# Patient Record
Sex: MALE | Race: WHITE | Employment: OTHER | URBAN - METROPOLITAN AREA
[De-identification: names, ages, dates, MRNs, and addresses within clinical notes are randomized per-mention and may not be internally consistent; named-entity substitution may affect disease eponyms.]

---

## 2017-01-19 ENCOUNTER — OFFICE VISIT (OUTPATIENT)
Dept: PAIN CLINIC | Facility: HOSPITAL | Age: 77
End: 2017-01-19
Attending: ANESTHESIOLOGY
Payer: MEDICARE

## 2017-01-19 DIAGNOSIS — M51.16 LUMBAR DISC HERNIATION WITH RADICULOPATHY: ICD-10-CM

## 2017-01-19 DIAGNOSIS — M54.2 CERVICALGIA: Primary | ICD-10-CM

## 2017-01-19 PROCEDURE — 99211 OFF/OP EST MAY X REQ PHY/QHP: CPT

## 2017-01-19 NOTE — CHRONIC PAIN
Initial Consultation Note      HISTORY OF PRESENT ILLNESS:  Kristan Miller is a 78year old old male referred to the pain clinic by Dr. Martha harrison.  provider found for low back pain and bilateral lateral leg pain which the patient states that originally began many Incontinence: as above  Coughing/sneezing/straining does  exacerbate the pain.   Numbness/tingling: as above  Weakness: as above  Weight Loss: Negative   Fever: Negative   Cardiovascular:  No current chest pain or palpitations   Respiratory:  No current yordy on file    Alcohol Use: Yes    Comment: occasionally    Drug Use: No    Sexual Activity: Not on file   Not on file  Other Topics Concern    Caffeine Concern No    Pt has a pacemaker No    Pt has a defibrillator No    Reaction to local anesthetic No     Soc worse on the right than the left causing severe subarticular zone narrowing L2-3 disc osteophyte complex bilateral facet arthrosis spinal canal stenosis moderate to severe L3-4 L4-5 also degenerative disc disease with moderate stenosis.     LABS:    Lab Res

## 2017-01-19 NOTE — PROGRESS NOTES
NEW TO [facility] 12-22-16. WAS A . FELL OFF A LADDER ONTO FLOOR ON TAILBONE 1999. XRAYS DONE. MRI Hiram Lau. TOLD SCIATIC NERVE IRRITATED. PAIN DOWN LEFT LEG, HAD PHYSICAL THERAPY. PAIN LASTED A COUPLE OF WEEKS. TRACTION X 2 RESOLVED PAIN.

## 2017-01-20 ENCOUNTER — OFFICE VISIT (OUTPATIENT)
Dept: INTEGRATIVE MEDICINE | Facility: HOSPITAL | Age: 77
End: 2017-01-20
Attending: ANESTHESIOLOGY

## 2017-01-20 NOTE — PROGRESS NOTES
HPI:    Patient ID: Kristan Miller is a 78year old male.     HPI    Review of Systems         Current Outpatient Prescriptions:  Cyclobenzaprine HCl (FLEXERIL) 10 MG Oral Tab Take 1 tablet (10 mg total) by mouth 3 (three) times daily as needed for Muscle spas shoulders and hips B/I    Goals: Client will stretch in the morning and evening doing ITB Stretch for hips with light stretching         PLAN:     Treatment Plan: Client will reschedule massage and possibly acccupunture    Self-Care Plan:Streching of hips

## 2017-01-23 ENCOUNTER — OFFICE VISIT (OUTPATIENT)
Dept: FAMILY MEDICINE CLINIC | Facility: CLINIC | Age: 77
End: 2017-01-23

## 2017-01-23 VITALS
SYSTOLIC BLOOD PRESSURE: 142 MMHG | HEART RATE: 65 BPM | TEMPERATURE: 98 F | BODY MASS INDEX: 35.83 KG/M2 | HEIGHT: 67.5 IN | WEIGHT: 231 LBS | DIASTOLIC BLOOD PRESSURE: 92 MMHG

## 2017-01-23 DIAGNOSIS — E78.00 HYPERCHOLESTEROLEMIA: ICD-10-CM

## 2017-01-23 DIAGNOSIS — Z12.5 SCREENING FOR PROSTATE CANCER: ICD-10-CM

## 2017-01-23 DIAGNOSIS — I10 ESSENTIAL HYPERTENSION: Primary | ICD-10-CM

## 2017-01-23 DIAGNOSIS — Z00.00 ENCOUNTER FOR ANNUAL HEALTH EXAMINATION: ICD-10-CM

## 2017-01-23 PROCEDURE — G0438 PPPS, INITIAL VISIT: HCPCS | Performed by: FAMILY MEDICINE

## 2017-01-23 PROCEDURE — 96160 PT-FOCUSED HLTH RISK ASSMT: CPT | Performed by: FAMILY MEDICINE

## 2017-01-23 RX ORDER — LISINOPRIL 20 MG/1
TABLET ORAL
Qty: 30 TABLET | Refills: 11 | Status: SHIPPED | OUTPATIENT
Start: 2017-01-23 | End: 2018-01-09

## 2017-01-23 RX ORDER — SIMVASTATIN 20 MG
TABLET ORAL
Qty: 30 TABLET | Refills: 11 | Status: SHIPPED | OUTPATIENT
Start: 2017-01-23 | End: 2018-01-09

## 2017-01-23 NOTE — PATIENT INSTRUCTIONS
Verónica Cruz's SCREENING SCHEDULE   Tests on this list are recommended by your physician but may not be covered, or covered at this frequency, by your insurer. Please check with your insurance carrier before scheduling to verify coverage.     PREVENTATIVE Covered every 10 years- more often if abnormal Colonoscopy,5 Years due on 10/27/2019 Update [facility] if applicable    Flex Sigmoidoscopy Screen  Covered every 5 years No results found for this or any previous visit. No flowsheet data found. Medicare does not cover unless Medically needed    Zoster (Not covered by Medicare Part B) No orders found for this or any previous visit.  This may be covered with your pharmacy  prescription benefits     Recommended Websites for Advanced Directives    htt

## 2017-01-23 NOTE — PROGRESS NOTES
HPI:   Leah Hazel is a 78year old male who presents for a Medicare Subsequent Annual Wellness visit (Pt already had Initial Annual Wellness).     Patient presents with:  Physical: Medicare Annual    His last annual assessment has been over 1 year: Coretta Mariee Back problem; Osteoarthritis; Bronchitis; and Calculus of kidney. He  has past surgical history that includes Retinal laser procedure (8/7/12); tonsillectomy; and other surgical history.     His family history includes Cancer in his father; Colon Cancer to   understand conversations:  No   I have to worry about missing the telephone ring or doorbell:  No I have   trouble hearing conversations in a noisy background such as a crowded room   or restaurant:  Sometimes   I get confused about where sounds come Nancy does not have a Power of  for [employer] on file in [place]. Discussed with patient and provided information             PLAN:  The patient indicates understanding of these issues and agrees to the plan. No Follow-up on file.      Kate Horan activities?: 0-No     Have you had any memory issues?: 0-No    Fall/Risk Scorin    Scoring Interpretation: 0 - 3 No Risk     Depression Screening (PHQ-2/PHQ-9): Over the LAST 2 WEEKS   Little interest or pleasure in doing things (over the last two week flowsheet data found. Prostate Cancer Screening      PSA  Annually PSA due on 01/28/2018  Update Health Maintenance if applicable   Immunizations      Influenza No orders found for this or any previous visit.  Update Immunization Activity if applicable Capsule Delayed Release Take  by mouth. once daily Disp:  Rfl:    Cyclobenzaprine HCl (FLEXERIL) 10 MG Oral Tab Take 1 tablet (10 mg total) by mouth 3 (three) times daily as needed for Muscle spasms.  Disp: 21 tablet Rfl: 1     Allergies:No Known Allergies

## 2017-01-25 ENCOUNTER — APPOINTMENT (OUTPATIENT)
Dept: LAB | Age: 77
End: 2017-01-25
Attending: FAMILY MEDICINE
Payer: MEDICARE

## 2017-01-25 DIAGNOSIS — Z12.5 SCREENING FOR PROSTATE CANCER: ICD-10-CM

## 2017-01-25 DIAGNOSIS — I10 ESSENTIAL HYPERTENSION: ICD-10-CM

## 2017-01-25 LAB
ALBUMIN SERPL BCP-MCNC: 4 G/DL (ref 3.5–4.8)
ALBUMIN/GLOB SERPL: 1.6 {RATIO} (ref 1–2)
ALP SERPL-CCNC: 66 U/L (ref 32–100)
ALT SERPL-CCNC: 28 U/L (ref 17–63)
ANION GAP SERPL CALC-SCNC: 8 MMOL/L (ref 0–18)
AST SERPL-CCNC: 20 U/L (ref 15–41)
BILIRUB SERPL-MCNC: 0.9 MG/DL (ref 0.3–1.2)
BILIRUB UR QL: NEGATIVE
BUN SERPL-MCNC: 15 MG/DL (ref 8–20)
BUN/CREAT SERPL: 12.1 (ref 10–20)
CALCIUM SERPL-MCNC: 9.2 MG/DL (ref 8.5–10.5)
CHLORIDE SERPL-SCNC: 105 MMOL/L (ref 95–110)
CHOLEST SERPL-MCNC: 153 MG/DL (ref 110–200)
CLARITY UR: CLEAR
CO2 SERPL-SCNC: 31 MMOL/L (ref 22–32)
COLOR UR: YELLOW
CREAT SERPL-MCNC: 1.24 MG/DL (ref 0.5–1.5)
GLOBULIN PLAS-MCNC: 2.5 G/DL (ref 2.5–3.7)
GLUCOSE SERPL-MCNC: 126 MG/DL (ref 70–99)
GLUCOSE UR-MCNC: NEGATIVE MG/DL
HDLC SERPL-MCNC: 41 MG/DL
HGB UR QL STRIP.AUTO: NEGATIVE
KETONES UR-MCNC: NEGATIVE MG/DL
LDLC SERPL CALC-MCNC: 87 MG/DL (ref 0–99)
LEUKOCYTE ESTERASE UR QL STRIP.AUTO: NEGATIVE
NITRITE UR QL STRIP.AUTO: NEGATIVE
NONHDLC SERPL-MCNC: 112 MG/DL
OSMOLALITY UR CALC.SUM OF ELEC: 300 MOSM/KG (ref 275–295)
PH UR: 6 [PH] (ref 5–8)
POTASSIUM SERPL-SCNC: 4.9 MMOL/L (ref 3.3–5.1)
PROT SERPL-MCNC: 6.5 G/DL (ref 5.9–8.4)
PROT UR-MCNC: NEGATIVE MG/DL
PSA SERPL-MCNC: 2.6 NG/ML (ref 0–4)
SODIUM SERPL-SCNC: 144 MMOL/L (ref 136–144)
SP GR UR STRIP: 1.01 (ref 1–1.03)
TRIGL SERPL-MCNC: 124 MG/DL (ref 1–149)
UROBILINOGEN UR STRIP-ACNC: <2
VIT C UR-MCNC: NEGATIVE MG/DL

## 2017-01-25 PROCEDURE — 80061 LIPID PANEL: CPT

## 2017-01-25 PROCEDURE — 81003 URINALYSIS AUTO W/O SCOPE: CPT

## 2017-01-25 PROCEDURE — 80053 COMPREHEN METABOLIC PANEL: CPT

## 2017-01-25 PROCEDURE — 36415 COLL VENOUS BLD VENIPUNCTURE: CPT

## 2017-02-10 ENCOUNTER — OFFICE VISIT (OUTPATIENT)
Dept: INTEGRATIVE MEDICINE | Facility: HOSPITAL | Age: 77
End: 2017-02-10
Attending: GENERAL ACUTE CARE HOSPITAL

## 2017-02-10 DIAGNOSIS — M54.42 CHRONIC BILATERAL LOW BACK PAIN WITH LEFT-SIDED SCIATICA: Primary | ICD-10-CM

## 2017-02-10 DIAGNOSIS — G89.29 CHRONIC BILATERAL LOW BACK PAIN WITH LEFT-SIDED SCIATICA: Primary | ICD-10-CM

## 2017-02-10 NOTE — PROGRESS NOTES
Frannie Ferrell came in to help prevent the pain in his back from coming back. He has had chronic sciatic problems on the left side of his back.   Then is the fall around October 2016, he was carrying something in from the yard and felt his back tighten and it nev biggest trigger. High BP.   Skin: Good and cuts his nails sometimes twice a week because they are growing so fast.  Emotional: He has had some major stresses that he didn't go into and it has led to his emotions being all over the place and still feels t

## 2017-02-21 ENCOUNTER — OFFICE VISIT (OUTPATIENT)
Dept: INTEGRATIVE MEDICINE | Facility: HOSPITAL | Age: 77
End: 2017-02-21
Attending: GENERAL ACUTE CARE HOSPITAL

## 2017-02-21 DIAGNOSIS — M54.42 CHRONIC BILATERAL LOW BACK PAIN WITH LEFT-SIDED SCIATICA: Primary | ICD-10-CM

## 2017-02-21 DIAGNOSIS — G89.29 CHRONIC BILATERAL LOW BACK PAIN WITH LEFT-SIDED SCIATICA: Primary | ICD-10-CM

## 2017-02-21 NOTE — PROGRESS NOTES
Harjinder Paige was feeling so well from the previous treatment that he did not have any pain in his low back. He was even able to walk around the auto show for four hours and only felt a little tight at the end.   He is not getting any of the radiating pain anymor

## 2017-03-10 ENCOUNTER — OFFICE VISIT (OUTPATIENT)
Dept: INTEGRATIVE MEDICINE | Facility: HOSPITAL | Age: 77
End: 2017-03-10
Attending: GENERAL ACUTE CARE HOSPITAL

## 2017-03-10 DIAGNOSIS — M54.42 CHRONIC BILATERAL LOW BACK PAIN WITH LEFT-SIDED SCIATICA: Primary | ICD-10-CM

## 2017-03-10 DIAGNOSIS — G89.29 CHRONIC BILATERAL LOW BACK PAIN WITH LEFT-SIDED SCIATICA: Primary | ICD-10-CM

## 2017-03-10 NOTE — PROGRESS NOTES
Verónica Kent has seen such an improvement and it has continued to hold. He only feels a slight bit of stiffness in the morning and if he has been sitting for a long time on a hard chair.  Then he will move to a softer, more comfortable chair and recline to help

## 2017-04-25 ENCOUNTER — OFFICE VISIT (OUTPATIENT)
Dept: FAMILY MEDICINE CLINIC | Facility: CLINIC | Age: 77
End: 2017-04-25

## 2017-04-25 VITALS
BODY MASS INDEX: 36 KG/M2 | TEMPERATURE: 99 F | WEIGHT: 231 LBS | HEART RATE: 76 BPM | SYSTOLIC BLOOD PRESSURE: 143 MMHG | DIASTOLIC BLOOD PRESSURE: 87 MMHG

## 2017-04-25 DIAGNOSIS — J01.00 ACUTE MAXILLARY SINUSITIS, RECURRENCE NOT SPECIFIED: Primary | ICD-10-CM

## 2017-04-25 PROBLEM — D69.6 THROMBOCYTOPENIA (HCC): Chronic | Status: ACTIVE | Noted: 2017-04-25

## 2017-04-25 PROBLEM — E66.01 SEVERE OBESITY (BMI 35.0-39.9) WITH COMORBIDITY (HCC): Chronic | Status: ACTIVE | Noted: 2017-04-25

## 2017-04-25 PROCEDURE — G0463 HOSPITAL OUTPT CLINIC VISIT: HCPCS | Performed by: FAMILY MEDICINE

## 2017-04-25 PROCEDURE — 99213 OFFICE O/P EST LOW 20 MIN: CPT | Performed by: FAMILY MEDICINE

## 2017-04-25 RX ORDER — AMOXICILLIN AND CLAVULANATE POTASSIUM 875; 125 MG/1; MG/1
1 TABLET, FILM COATED ORAL 2 TIMES DAILY
Qty: 14 TABLET | Refills: 0 | Status: SHIPPED | OUTPATIENT
Start: 2017-04-25 | End: 2017-05-05

## 2017-04-25 NOTE — PROGRESS NOTES
HPI:    Patient ID: Matty Xiao is a 78year old male. HPI  Patient presents with:  Ear Pain: c/o right ear pain  Sore Throat    Review of Systems   Constitutional: Negative. HENT: Positive for congestion, rhinorrhea and sinus pressure.     Respirator

## 2017-04-27 ENCOUNTER — TELEPHONE (OUTPATIENT)
Dept: INTEGRATIVE MEDICINE | Facility: HOSPITAL | Age: 77
End: 2017-04-27

## 2017-05-01 ENCOUNTER — OFFICE VISIT (OUTPATIENT)
Dept: FAMILY MEDICINE CLINIC | Facility: CLINIC | Age: 77
End: 2017-05-01

## 2017-05-01 VITALS
WEIGHT: 231 LBS | DIASTOLIC BLOOD PRESSURE: 87 MMHG | TEMPERATURE: 98 F | SYSTOLIC BLOOD PRESSURE: 143 MMHG | HEART RATE: 69 BPM | BODY MASS INDEX: 36 KG/M2

## 2017-05-01 DIAGNOSIS — H61.21 IMPACTED CERUMEN OF RIGHT EAR: ICD-10-CM

## 2017-05-01 DIAGNOSIS — H92.01 EAR PAIN, RIGHT: Primary | ICD-10-CM

## 2017-05-01 PROCEDURE — 99212 OFFICE O/P EST SF 10 MIN: CPT | Performed by: FAMILY MEDICINE

## 2017-05-01 PROCEDURE — G0463 HOSPITAL OUTPT CLINIC VISIT: HCPCS | Performed by: FAMILY MEDICINE

## 2017-05-01 PROCEDURE — 69209 REMOVE IMPACTED EAR WAX UNI: CPT | Performed by: FAMILY MEDICINE

## 2017-05-01 NOTE — PROGRESS NOTES
HPI:    Patient ID: Jewell Martinez is a 78year old male. HPI  Patient presents with:  Ear Pain: f/u from 4/25/17, right ear pain     Review of Systems   Constitutional: Negative. HENT: Positive for ear pain and hearing loss.              Current Outpati

## 2017-05-15 ENCOUNTER — TELEPHONE (OUTPATIENT)
Dept: FAMILY MEDICINE CLINIC | Facility: CLINIC | Age: 77
End: 2017-05-15

## 2017-05-15 NOTE — TELEPHONE ENCOUNTER
Pt's wife called in wanting to let Dr. Connor Hearn know that pt has been admitted to [facility] yesterday evening.   Pt's wife states pt has TIA and is going to need a heart monitor/may need a referral to see a cardiologist.  Pt's wife did book a hospital f

## 2017-05-16 NOTE — TELEPHONE ENCOUNTER
Left message on wifes VM stating we have been getting faxes from [facility] regarding pt

## 2017-05-16 NOTE — TELEPHONE ENCOUNTER
Dr. Ely Sanabria,   see below, all information is in your blue folder from [facility], pt has appt. Thursday with you.

## 2017-05-18 ENCOUNTER — OFFICE VISIT (OUTPATIENT)
Dept: FAMILY MEDICINE CLINIC | Facility: CLINIC | Age: 77
End: 2017-05-18

## 2017-05-18 VITALS
WEIGHT: 225 LBS | HEIGHT: 69 IN | RESPIRATION RATE: 16 BRPM | DIASTOLIC BLOOD PRESSURE: 84 MMHG | SYSTOLIC BLOOD PRESSURE: 124 MMHG | HEART RATE: 64 BPM | TEMPERATURE: 99 F | BODY MASS INDEX: 33.33 KG/M2

## 2017-05-18 DIAGNOSIS — I72.9 ANEURYSM (HCC): Primary | ICD-10-CM

## 2017-05-18 DIAGNOSIS — G45.9 TRANSIENT CEREBRAL ISCHEMIA, UNSPECIFIED TYPE: ICD-10-CM

## 2017-05-18 DIAGNOSIS — I10 ESSENTIAL HYPERTENSION: ICD-10-CM

## 2017-05-18 PROCEDURE — 99214 OFFICE O/P EST MOD 30 MIN: CPT | Performed by: FAMILY MEDICINE

## 2017-05-18 PROCEDURE — G0463 HOSPITAL OUTPT CLINIC VISIT: HCPCS | Performed by: FAMILY MEDICINE

## 2017-05-18 RX ORDER — ASPIRIN 325 MG
325 TABLET ORAL DAILY
Status: ON HOLD | COMMUNITY
End: 2020-07-26

## 2017-05-19 NOTE — PROGRESS NOTES
HPI:    Patient ID: Arvind Paulino is a 78year old male. HPI  Patient presents with:  Numbness: Pt is f/u from a visit at [facility] due to an episode of numbness. Pt states he feels better today. States the episode ended about 5 hours after it happened. Transient cerebral ischemia, unspecified type  Essential hypertension  Aneurysm (hcc)  (primary encounter diagnosis)  He is currently on a heart monitor. Recommend he follow-up cardiology with heart monitor is completed. Recommend daily aspirin.   Ryan Mendez

## 2017-05-23 ENCOUNTER — OFFICE VISIT (OUTPATIENT)
Dept: INTEGRATIVE MEDICINE | Facility: HOSPITAL | Age: 77
End: 2017-05-23
Attending: GENERAL ACUTE CARE HOSPITAL

## 2017-05-23 DIAGNOSIS — N52.8 OTHER MALE ERECTILE DYSFUNCTION: Primary | ICD-10-CM

## 2017-05-23 NOTE — PROGRESS NOTES
Carlos Bates came in today wanting to work on erectile dysfunction. He has found that he is not able to get full erect for the past two years. He has a hard time as well sustaining an erection as well.   He was in the hospital today with his wife fore some thin

## 2017-05-30 ENCOUNTER — OFFICE VISIT (OUTPATIENT)
Dept: INTEGRATIVE MEDICINE | Facility: HOSPITAL | Age: 77
End: 2017-05-30
Attending: GENERAL ACUTE CARE HOSPITAL

## 2017-05-30 DIAGNOSIS — N52.8 OTHER MALE ERECTILE DYSFUNCTION: Primary | ICD-10-CM

## 2017-05-30 NOTE — PROGRESS NOTES
Shylamookie Sneha said that while he still has his heart monitor on to detect if he has atrial fibrillation, the has not been in the mood to have intercourse.   We will continue with treatments and see how he is doing and then after next treatment he goes to have the

## 2017-06-15 ENCOUNTER — OFFICE VISIT (OUTPATIENT)
Dept: INTEGRATIVE MEDICINE | Facility: HOSPITAL | Age: 77
End: 2017-06-15
Attending: GENERAL ACUTE CARE HOSPITAL

## 2017-06-15 DIAGNOSIS — N52.8 OTHER MALE ERECTILE DYSFUNCTION: Primary | ICD-10-CM

## 2017-06-15 NOTE — PROGRESS NOTES
Chas Kenyon just had his heart monitor removed and his primary reason for coming in right now is erectile dysfunction. He commented last treatment and again today that he and his wife were trying to have intercourse with the monitor on.  Now that it is removed

## 2017-06-19 ENCOUNTER — OFFICE VISIT (OUTPATIENT)
Dept: DERMATOLOGY CLINIC | Facility: CLINIC | Age: 77
End: 2017-06-19

## 2017-06-19 DIAGNOSIS — D23.60 BENIGN NEOPLASM OF SKIN OF UPPER LIMB, INCLUDING SHOULDER, UNSPECIFIED LATERALITY: ICD-10-CM

## 2017-06-19 DIAGNOSIS — Z86.018 HISTORY OF DYSPLASTIC NEVUS: Primary | ICD-10-CM

## 2017-06-19 DIAGNOSIS — D23.30 BENIGN NEOPLASM OF SKIN OF FACE: ICD-10-CM

## 2017-06-19 DIAGNOSIS — D23.70 BENIGN NEOPLASM OF SKIN OF LOWER LIMB, INCLUDING HIP, UNSPECIFIED LATERALITY: ICD-10-CM

## 2017-06-19 DIAGNOSIS — D23.4 BENIGN NEOPLASM OF SCALP AND SKIN OF NECK: ICD-10-CM

## 2017-06-19 DIAGNOSIS — L82.1 SEBORRHEIC KERATOSES: ICD-10-CM

## 2017-06-19 DIAGNOSIS — D22.9 MULTIPLE NEVI: ICD-10-CM

## 2017-06-19 DIAGNOSIS — D23.5 BENIGN NEOPLASM OF SKIN OF TRUNK, EXCEPT SCROTUM: ICD-10-CM

## 2017-06-19 PROCEDURE — 99213 OFFICE O/P EST LOW 20 MIN: CPT | Performed by: DERMATOLOGY

## 2017-06-19 PROCEDURE — G0463 HOSPITAL OUTPT CLINIC VISIT: HCPCS | Performed by: DERMATOLOGY

## 2017-06-20 ENCOUNTER — TELEPHONE (OUTPATIENT)
Dept: CARDIOLOGY CLINIC | Facility: CLINIC | Age: 77
End: 2017-06-20

## 2017-06-20 NOTE — TELEPHONE ENCOUNTER
Pts wife called to find out if monitor results were received from [facility] Andino. Pt has consult appt on Thurs 6/22/17 with Dr. Sonia Soliz and needs to make sure records have been received. Please call.

## 2017-06-22 ENCOUNTER — OFFICE VISIT (OUTPATIENT)
Dept: CARDIOLOGY CLINIC | Facility: CLINIC | Age: 77
End: 2017-06-22

## 2017-06-22 ENCOUNTER — OFFICE VISIT (OUTPATIENT)
Dept: INTEGRATIVE MEDICINE | Facility: HOSPITAL | Age: 77
End: 2017-06-22
Attending: GENERAL ACUTE CARE HOSPITAL

## 2017-06-22 VITALS
DIASTOLIC BLOOD PRESSURE: 70 MMHG | RESPIRATION RATE: 16 BRPM | BODY MASS INDEX: 34.51 KG/M2 | WEIGHT: 233 LBS | SYSTOLIC BLOOD PRESSURE: 124 MMHG | HEIGHT: 69 IN | HEART RATE: 74 BPM

## 2017-06-22 DIAGNOSIS — I10 ESSENTIAL HYPERTENSION: Primary | ICD-10-CM

## 2017-06-22 DIAGNOSIS — E78.00 HYPERCHOLESTEROLEMIA: ICD-10-CM

## 2017-06-22 DIAGNOSIS — R93.89 ABNORMAL FINDINGS ON DIAGNOSTIC IMAGING OF CARDIOVASCULAR SYSTEM: ICD-10-CM

## 2017-06-22 DIAGNOSIS — N52.8 OTHER MALE ERECTILE DYSFUNCTION: Primary | ICD-10-CM

## 2017-06-22 PROCEDURE — 99204 OFFICE O/P NEW MOD 45 MIN: CPT | Performed by: INTERNAL MEDICINE

## 2017-06-22 PROCEDURE — G0463 HOSPITAL OUTPT CLINIC VISIT: HCPCS | Performed by: INTERNAL MEDICINE

## 2017-06-22 RX ORDER — GARLIC EXTRACT 500 MG
1 CAPSULE ORAL DAILY
COMMUNITY
End: 2020-01-06

## 2017-06-22 NOTE — H&P
Aarti Mcbride is a 78year old male. HPI:   This is a pleasant 30-year-old with hypertension and elevated cholesterol who presents for cardiac assessment of abnormal Holter monitor after he had a workup for numbness of the right side in May.   Patient presen laser surgery   • Back problem    • Osteoarthritis    • Bronchitis    • Calculus of kidney       Social History:    Smoking Status: Never Smoker                      Alcohol Use: Yes                Comment: occasionally       REVIEW OF SYSTEMS:   GENERAL H assess his TIA symptoms and description of a aneurysm detected on CT of his brain. He will call if changes. He understands of the stress test is abnormal further ischemic testing may be required  - CARD NUCLEAR EXERCISE STRESS TEST ([id number]=[id number]);  Future

## 2017-06-27 ENCOUNTER — HOSPITAL ENCOUNTER (OUTPATIENT)
Dept: NUCLEAR MEDICINE | Facility: HOSPITAL | Age: 77
Discharge: HOME | End: 2017-06-27
Attending: INTERNAL MEDICINE
Payer: MEDICARE

## 2017-06-27 ENCOUNTER — APPOINTMENT (OUTPATIENT)
Dept: INTEGRATIVE MEDICINE | Facility: HOSPITAL | Age: 77
End: 2017-06-27
Attending: GENERAL ACUTE CARE HOSPITAL

## 2017-06-27 ENCOUNTER — HOSPITAL ENCOUNTER (OUTPATIENT)
Dept: CV DIAGNOSTICS | Facility: HOSPITAL | Age: 77
Discharge: HOME | End: 2017-06-27
Attending: INTERNAL MEDICINE
Payer: MEDICARE

## 2017-06-27 DIAGNOSIS — I10 ESSENTIAL HYPERTENSION: ICD-10-CM

## 2017-06-27 DIAGNOSIS — E78.00 HYPERCHOLESTEROLEMIA: ICD-10-CM

## 2017-06-27 DIAGNOSIS — R93.89 ABNORMAL FINDINGS ON DIAGNOSTIC IMAGING OF CARDIOVASCULAR SYSTEM: ICD-10-CM

## 2017-06-27 PROCEDURE — 93017 CV STRESS TEST TRACING ONLY: CPT | Performed by: INTERNAL MEDICINE

## 2017-06-27 PROCEDURE — 93018 CV STRESS TEST I&R ONLY: CPT | Performed by: INTERNAL MEDICINE

## 2017-06-27 PROCEDURE — 78452 HT MUSCLE IMAGE SPECT MULT: CPT | Performed by: INTERNAL MEDICINE

## 2017-06-27 PROCEDURE — 93016 CV STRESS TEST SUPVJ ONLY: CPT | Performed by: INTERNAL MEDICINE

## 2017-06-27 RX ORDER — SODIUM CHLORIDE 9 MG/ML
INJECTION, SOLUTION INTRAVENOUS
Status: COMPLETED
Start: 2017-06-27 | End: 2017-06-27

## 2017-06-27 RX ADMIN — SODIUM CHLORIDE 50 ML: 9 INJECTION, SOLUTION INTRAVENOUS at 13:45:00

## 2017-06-29 ENCOUNTER — TELEPHONE (OUTPATIENT)
Dept: CARDIOLOGY CLINIC | Facility: CLINIC | Age: 77
End: 2017-06-29

## 2017-06-29 RX ORDER — METOPROLOL SUCCINATE 25 MG/1
12.5 TABLET, EXTENDED RELEASE ORAL DAILY
Qty: 15 TABLET | Refills: 2 | Status: SHIPPED | OUTPATIENT
Start: 2017-06-29 | End: 2017-09-25

## 2017-06-29 NOTE — TELEPHONE ENCOUNTER
Per MDB, pt should start on toprol 12.5 mg daily and continue monitor BP/ pulse. Pt notified. Rx sent.

## 2017-07-09 NOTE — PROGRESS NOTES
Tonny Stahl is a 78year old male. HPI:     CC:  Patient presents with:  Full Skin Exam: LOV 1/8/2017. Pt presenting for full body skin exam. Pt has a personal hx of dyslastic nevi.         Allergies:  Review of patient's allergies indicates no known aller by mouth. once daily Disp:  Rfl:    Metoprolol Succinate ER 25 MG Oral Tablet 24 Hr Take 0.5 tablets (12.5 mg total) by mouth daily. Disp: 15 tablet Rfl: 2   Acidophilus/Pectin Oral Cap Take 1 capsule by mouth daily.  Disp:  Rfl:      Allergies:   No Known Pt presenting for full body skin exam. Pt has a personal hx of dyslastic nevi. Patient presents with concerns above. Patient has been in their usual state of health. History, medications, allergies reviewed as noted.       ROS:  Denies any other sy lower back excised with margins. Multiple benign dermatofibromas reassurance    See prior body maps. Irregular patches consistent with keratoses inflamed partial response to cryo previously mid back, left scapula.     Sun damage, extensive keratoses,

## 2017-07-17 ENCOUNTER — OFFICE VISIT (OUTPATIENT)
Dept: FAMILY MEDICINE CLINIC | Facility: CLINIC | Age: 77
End: 2017-07-17

## 2017-07-17 VITALS
WEIGHT: 233 LBS | SYSTOLIC BLOOD PRESSURE: 124 MMHG | BODY MASS INDEX: 34 KG/M2 | HEART RATE: 56 BPM | DIASTOLIC BLOOD PRESSURE: 84 MMHG | TEMPERATURE: 98 F

## 2017-07-17 DIAGNOSIS — I67.1 INTRACRANIAL ANEURYSM: Primary | ICD-10-CM

## 2017-07-17 PROCEDURE — G0463 HOSPITAL OUTPT CLINIC VISIT: HCPCS | Performed by: FAMILY MEDICINE

## 2017-07-17 PROCEDURE — 99214 OFFICE O/P EST MOD 30 MIN: CPT | Performed by: FAMILY MEDICINE

## 2017-09-25 ENCOUNTER — TELEPHONE (OUTPATIENT)
Dept: CARDIOLOGY CLINIC | Facility: CLINIC | Age: 77
End: 2017-09-25

## 2017-09-25 RX ORDER — METOPROLOL SUCCINATE 25 MG/1
12.5 TABLET, EXTENDED RELEASE ORAL DAILY
Qty: 15 TABLET | Refills: 3 | Status: SHIPPED | OUTPATIENT
Start: 2017-09-25 | End: 2018-01-09

## 2017-09-25 NOTE — TELEPHONE ENCOUNTER
Pharmacy calling regarding rx: Metoprolol, indicates still waiting for authorization for rx.  Pls call at:[phone],thanks    Current Outpatient Prescriptions:   •  Metoprolol Succinate ER 25 MG Oral Tablet 24 Hr, Take 0.5 tablets (12.5 mg total) by vitor

## 2017-09-27 NOTE — TELEPHONE ENCOUNTER
Wife called and states the pharmacy did not received approval for refill. Was unable to reach RN.  Please call [phone] Thank You

## 2017-10-16 ENCOUNTER — OFFICE VISIT (OUTPATIENT)
Dept: FAMILY MEDICINE CLINIC | Facility: CLINIC | Age: 77
End: 2017-10-16

## 2017-10-16 VITALS
DIASTOLIC BLOOD PRESSURE: 85 MMHG | WEIGHT: 230 LBS | BODY MASS INDEX: 34 KG/M2 | HEART RATE: 55 BPM | SYSTOLIC BLOOD PRESSURE: 138 MMHG

## 2017-10-16 DIAGNOSIS — I67.1 INTRACRANIAL ANEURYSM: Primary | ICD-10-CM

## 2017-10-16 PROCEDURE — G0463 HOSPITAL OUTPT CLINIC VISIT: HCPCS | Performed by: FAMILY MEDICINE

## 2017-10-16 PROCEDURE — 99214 OFFICE O/P EST MOD 30 MIN: CPT | Performed by: FAMILY MEDICINE

## 2017-10-16 RX ORDER — TADALAFIL 20 MG/1
20 TABLET ORAL AS NEEDED
Qty: 5 TABLET | Refills: 5 | Status: SHIPPED | OUTPATIENT
Start: 2017-10-16 | End: 2019-01-03

## 2017-10-16 NOTE — PROGRESS NOTES
HPI:    Patient ID: David Lang is a 78year old male. HPI  Patient presents with:  Orders Call: pt would like 6 month f/u order for MRI of the brain to f/u anuerysm    Review of Systems   Constitutional: Negative. Neurological: Negative.

## 2017-10-19 ENCOUNTER — HOSPITAL ENCOUNTER (OUTPATIENT)
Dept: CT IMAGING | Facility: HOSPITAL | Age: 77
Discharge: HOME | End: 2017-10-19
Attending: FAMILY MEDICINE
Payer: MEDICARE

## 2017-10-19 ENCOUNTER — TELEPHONE (OUTPATIENT)
Dept: FAMILY MEDICINE CLINIC | Facility: CLINIC | Age: 77
End: 2017-10-19

## 2017-10-19 DIAGNOSIS — I67.1 INTRACRANIAL ANEURYSM: ICD-10-CM

## 2017-10-19 DIAGNOSIS — R00.2 PALPITATIONS: Primary | ICD-10-CM

## 2017-10-19 PROCEDURE — 70496 CT ANGIOGRAPHY HEAD: CPT | Performed by: FAMILY MEDICINE

## 2017-10-19 PROCEDURE — 82565 ASSAY OF CREATININE: CPT

## 2017-10-19 NOTE — TELEPHONE ENCOUNTER
Dr. Jarred Nunes,    Pt's wife called K[facility] Mosley requesting a retro referral for 30 day heart monitor that was place on pt back in June. Per wife monitor was given at their home after pt was discharged from [facility]. They are now being billed for the reading of monitor from [address] [place] [address]. Spoke with health plan they are able to reverse the bill once a referral is placed. Please advise and sign off on referral.      Thank you, Man[facility].

## 2017-12-06 ENCOUNTER — NURSE TRIAGE (OUTPATIENT)
Dept: OTHER | Age: 77
End: 2017-12-06

## 2017-12-06 NOTE — TELEPHONE ENCOUNTER
Action Requested: Summary for Provider     []  Critical Lab, Recommendations Needed  [] Need Additional Advice  [x]   FYI    []   Need Orders  [] Need Medications Sent to Pharmacy  []  Other     SUMMARY: Pt reports has episode of syncope after \"working ou

## 2017-12-08 ENCOUNTER — LAB ENCOUNTER (OUTPATIENT)
Dept: LAB | Age: 77
End: 2017-12-08
Attending: FAMILY MEDICINE
Payer: MEDICARE

## 2017-12-08 ENCOUNTER — OFFICE VISIT (OUTPATIENT)
Dept: FAMILY MEDICINE CLINIC | Facility: CLINIC | Age: 77
End: 2017-12-08

## 2017-12-08 VITALS
DIASTOLIC BLOOD PRESSURE: 84 MMHG | HEART RATE: 60 BPM | BODY MASS INDEX: 34 KG/M2 | SYSTOLIC BLOOD PRESSURE: 143 MMHG | WEIGHT: 230 LBS

## 2017-12-08 DIAGNOSIS — R42 LIGHTHEADEDNESS: Primary | ICD-10-CM

## 2017-12-08 DIAGNOSIS — R73.09 ELEVATED GLUCOSE: ICD-10-CM

## 2017-12-08 DIAGNOSIS — R42 LIGHTHEADEDNESS: ICD-10-CM

## 2017-12-08 PROCEDURE — 85025 COMPLETE CBC W/AUTO DIFF WBC: CPT

## 2017-12-08 PROCEDURE — 99214 OFFICE O/P EST MOD 30 MIN: CPT | Performed by: FAMILY MEDICINE

## 2017-12-08 PROCEDURE — 36415 COLL VENOUS BLD VENIPUNCTURE: CPT

## 2017-12-08 PROCEDURE — G0463 HOSPITAL OUTPT CLINIC VISIT: HCPCS | Performed by: FAMILY MEDICINE

## 2017-12-08 PROCEDURE — 80048 BASIC METABOLIC PNL TOTAL CA: CPT

## 2017-12-08 PROCEDURE — 83036 HEMOGLOBIN GLYCOSYLATED A1C: CPT

## 2017-12-08 NOTE — PROGRESS NOTES
HPI:    Patient ID: Linn Hernandez is a 78year old male. HPI  Patient presents with:  Dizziness  On and off over the past month. More persistant. Especially with change in position. Review of Systems   Constitutional: Negative.     Respiratory: Negative Referrals:  None       #[id number]

## 2017-12-27 ENCOUNTER — TELEPHONE (OUTPATIENT)
Dept: CARDIOLOGY CLINIC | Facility: CLINIC | Age: 77
End: 2017-12-27

## 2017-12-27 NOTE — TELEPHONE ENCOUNTER
Patient requesting an appointment in January 2018 with dr Aníbal Doherty. States that he will be out until the 1st week of March 10, 2018. Offered an appointment Feb. 2018 - that's too late.

## 2018-01-03 ENCOUNTER — TELEPHONE (OUTPATIENT)
Dept: FAMILY MEDICINE CLINIC | Facility: CLINIC | Age: 78
End: 2018-01-03

## 2018-01-03 DIAGNOSIS — I10 HYPERTENSION, UNSPECIFIED TYPE: Primary | ICD-10-CM

## 2018-01-09 ENCOUNTER — OFFICE VISIT (OUTPATIENT)
Dept: CARDIOLOGY CLINIC | Facility: CLINIC | Age: 78
End: 2018-01-09

## 2018-01-09 VITALS
RESPIRATION RATE: 16 BRPM | DIASTOLIC BLOOD PRESSURE: 80 MMHG | SYSTOLIC BLOOD PRESSURE: 118 MMHG | WEIGHT: 234 LBS | BODY MASS INDEX: 35 KG/M2 | HEART RATE: 68 BPM

## 2018-01-09 DIAGNOSIS — G45.9 TRANSIENT CEREBRAL ISCHEMIA, UNSPECIFIED TYPE: ICD-10-CM

## 2018-01-09 DIAGNOSIS — E78.00 HYPERCHOLESTEROLEMIA: ICD-10-CM

## 2018-01-09 DIAGNOSIS — I10 ESSENTIAL HYPERTENSION: Primary | ICD-10-CM

## 2018-01-09 PROCEDURE — 99214 OFFICE O/P EST MOD 30 MIN: CPT | Performed by: INTERNAL MEDICINE

## 2018-01-09 PROCEDURE — G0463 HOSPITAL OUTPT CLINIC VISIT: HCPCS | Performed by: INTERNAL MEDICINE

## 2018-01-09 RX ORDER — METOPROLOL SUCCINATE 25 MG/1
12.5 TABLET, EXTENDED RELEASE ORAL DAILY
Qty: 15 TABLET | Refills: 3 | Status: SHIPPED | OUTPATIENT
Start: 2018-01-09 | End: 2018-01-11

## 2018-01-09 RX ORDER — SIMVASTATIN 20 MG
TABLET ORAL
Qty: 30 TABLET | Refills: 11 | Status: SHIPPED | OUTPATIENT
Start: 2018-01-09 | End: 2018-01-11

## 2018-01-09 RX ORDER — LISINOPRIL 20 MG/1
TABLET ORAL
Qty: 30 TABLET | Refills: 11 | Status: SHIPPED | OUTPATIENT
Start: 2018-01-09 | End: 2018-01-11

## 2018-01-09 NOTE — PROGRESS NOTES
Aarti Mcbride is a 78year old male. Patient presents with: Follow - Up    HPI:   This is a pleasant 44-year-old with hypertension and elevated cholesterol who presents for follow-up of his cardiac status.   Patient had a questionable TIA and arrhythmia in th Alcohol use:  Yes              Comment: occasionally       REVIEW OF SYSTEMS:   GENERAL HEALTH: feels well otherwise  SKIN: denies any unusual skin lesions or rashes  RESPIRATORY: denies shortness of breath with exertion  CARDIOVASCULAR:See HPI  GI: den

## 2018-01-10 ENCOUNTER — OFFICE VISIT (OUTPATIENT)
Dept: OPHTHALMOLOGY | Facility: CLINIC | Age: 78
End: 2018-01-10

## 2018-01-10 DIAGNOSIS — Z98.890 HISTORY OF REPAIR OF RETINAL TEAR BY LASER PHOTOCOAGULATION: ICD-10-CM

## 2018-01-10 DIAGNOSIS — H25.13 AGE-RELATED NUCLEAR CATARACT OF BOTH EYES: Primary | ICD-10-CM

## 2018-01-10 DIAGNOSIS — D31.31 CHOROIDAL NEVUS OF RIGHT EYE: ICD-10-CM

## 2018-01-10 PROBLEM — R73.03 PREDIABETES: Status: ACTIVE | Noted: 2018-01-10

## 2018-01-10 PROBLEM — N18.30 CHRONIC KIDNEY DISEASE, STAGE 3 (HCC): Status: ACTIVE | Noted: 2018-01-10

## 2018-01-10 PROCEDURE — 92015 DETERMINE REFRACTIVE STATE: CPT | Performed by: OPHTHALMOLOGY

## 2018-01-10 PROCEDURE — 92014 COMPRE OPH EXAM EST PT 1/>: CPT | Performed by: OPHTHALMOLOGY

## 2018-01-10 NOTE — ASSESSMENT & PLAN NOTE
Discussed early cataracts with patient. No treatment recommended at this time. Discussed with patient that his eyes look very stable. Mild distance only glasses today. Suggest +2.00 over the counter glasses for reading.

## 2018-01-10 NOTE — PROGRESS NOTES
Dana Rock is a 78year old male. HPI:     HPI     Pt denies any blurred vision at distance and is happy with his OTC reading glasses. Pt complains of occasional itching and would like to know what drops he can use OTC.      Last edited by Ofelia Roberto by mouth as needed for Erectile Dysfunction. Disp: 5 tablet Rfl: 5   Acidophilus/Pectin Oral Cap Take 1 capsule by mouth daily. Disp:  Rfl:    aspirin 325 MG Oral Tab Take 325 mg by mouth.  Disp:  Rfl:    Multiple Vitamin (MULTI-DAY) Oral Tab Take  by mouth 180             Manifest Refraction #2       Sphere Cylinder [place]    Right -1.00 Sphere  20/30- +3.00 20/20    Left -0.75 +0.50 180 20/30- +3.00 20/20          Manifest Refraction Comments     After checking pts vision he agreed to get

## 2018-01-10 NOTE — PATIENT INSTRUCTIONS
Age-related nuclear cataract of both eyes  Discussed early cataracts with patient. No treatment recommended at this time. Discussed with patient that his eyes look very stable. Mild distance only glasses today.   Suggest +2.00 over the counter glasses

## 2018-01-11 ENCOUNTER — TELEPHONE (OUTPATIENT)
Dept: FAMILY MEDICINE CLINIC | Facility: CLINIC | Age: 78
End: 2018-01-11

## 2018-01-11 ENCOUNTER — OFFICE VISIT (OUTPATIENT)
Dept: FAMILY MEDICINE CLINIC | Facility: CLINIC | Age: 78
End: 2018-01-11

## 2018-01-11 VITALS
HEIGHT: 67.5 IN | BODY MASS INDEX: 35.68 KG/M2 | SYSTOLIC BLOOD PRESSURE: 140 MMHG | TEMPERATURE: 98 F | DIASTOLIC BLOOD PRESSURE: 88 MMHG | WEIGHT: 230 LBS | HEART RATE: 67 BPM

## 2018-01-11 DIAGNOSIS — H25.13 AGE-RELATED NUCLEAR CATARACT OF BOTH EYES: ICD-10-CM

## 2018-01-11 DIAGNOSIS — G45.9 TRANSIENT CEREBRAL ISCHEMIA, UNSPECIFIED TYPE: ICD-10-CM

## 2018-01-11 DIAGNOSIS — Z13.6 SCREENING FOR CARDIOVASCULAR CONDITION: ICD-10-CM

## 2018-01-11 DIAGNOSIS — I10 ESSENTIAL HYPERTENSION: ICD-10-CM

## 2018-01-11 DIAGNOSIS — Z00.00 ENCOUNTER FOR ANNUAL HEALTH EXAMINATION: ICD-10-CM

## 2018-01-11 DIAGNOSIS — E78.00 HYPERCHOLESTEROLEMIA: ICD-10-CM

## 2018-01-11 DIAGNOSIS — D69.6 THROMBOCYTOPENIA (HCC): Chronic | ICD-10-CM

## 2018-01-11 DIAGNOSIS — N18.30 CHRONIC KIDNEY DISEASE, STAGE 3 (HCC): ICD-10-CM

## 2018-01-11 DIAGNOSIS — E66.01 SEVERE OBESITY (BMI 35.0-39.9) WITH COMORBIDITY (HCC): Primary | Chronic | ICD-10-CM

## 2018-01-11 DIAGNOSIS — Z23 NEED FOR VACCINATION: ICD-10-CM

## 2018-01-11 DIAGNOSIS — Z98.890 HISTORY OF REPAIR OF RETINAL TEAR BY LASER PHOTOCOAGULATION: ICD-10-CM

## 2018-01-11 DIAGNOSIS — R73.03 PREDIABETES: ICD-10-CM

## 2018-01-11 PROBLEM — R93.89 ABNORMAL FINDINGS ON DIAGNOSTIC IMAGING OF CARDIOVASCULAR SYSTEM: Status: RESOLVED | Noted: 2017-06-22 | Resolved: 2018-01-11

## 2018-01-11 PROCEDURE — G0009 ADMIN PNEUMOCOCCAL VACCINE: HCPCS | Performed by: FAMILY MEDICINE

## 2018-01-11 PROCEDURE — 90670 PCV13 VACCINE IM: CPT | Performed by: FAMILY MEDICINE

## 2018-01-11 PROCEDURE — G0439 PPPS, SUBSEQ VISIT: HCPCS | Performed by: FAMILY MEDICINE

## 2018-01-11 PROCEDURE — 96160 PT-FOCUSED HLTH RISK ASSMT: CPT | Performed by: FAMILY MEDICINE

## 2018-01-11 RX ORDER — LISINOPRIL 20 MG/1
TABLET ORAL
Qty: 30 TABLET | Refills: 11 | Status: SHIPPED | OUTPATIENT
Start: 2018-01-11 | End: 2019-01-03

## 2018-01-11 RX ORDER — METOPROLOL SUCCINATE 25 MG/1
12.5 TABLET, EXTENDED RELEASE ORAL DAILY
Qty: 15 TABLET | Refills: 5 | Status: SHIPPED | OUTPATIENT
Start: 2018-01-11 | End: 2019-01-03

## 2018-01-11 RX ORDER — SIMVASTATIN 20 MG
TABLET ORAL
Qty: 30 TABLET | Refills: 11 | Status: SHIPPED | OUTPATIENT
Start: 2018-01-11 | End: 2019-01-03

## 2018-01-11 RX ORDER — METOPROLOL SUCCINATE 25 MG/1
12.5 TABLET, EXTENDED RELEASE ORAL DAILY
Qty: 15 TABLET | Refills: 3 | Status: SHIPPED | OUTPATIENT
Start: 2018-01-11 | End: 2018-01-11

## 2018-01-11 NOTE — PATIENT INSTRUCTIONS
Carlos Cruz's SCREENING SCHEDULE   Tests on this list are recommended by your physician but may not be covered, or covered at this frequency, by your insurer. Please check with your insurance carrier before scheduling to verify coverage.     PREVENTATIVE 73-68 years old and have smoked more than 100 cigarettes in their lifetime   • Anyone with a family history    Colorectal Cancer Screening Covered up to Age 76     Colonoscopy Screen   Covered every 10 years- more often if abnormal Colonoscopy,5 Years due drug abusers     Tetanus Toxoid- Only covered with a cut with metal- TD and TDaP Not covered by Medicare Part B) No orders found for this or any previous visit.  This may be covered with your prescription benefits, but Medicare does not cover unless Medical

## 2018-01-11 NOTE — PROGRESS NOTES
HPI:   Dilcia Fang is a 78year old male who presents for a Medicare Subsequent Annual Wellness visit (Pt already had Initial Annual Wellness).     .  Annual Physical due on 01/23/2018        Fall/Risk Assessment   He has been screened for Falls and is lo Thrombocytopenia ([facility] [address]     TIA (transient ischemic attack)     Abnormal findings on diagnostic imaging of cardiovascular system     Chronic kidney disease, stage 3     Prediabetes    Wt Readings from Last 3 Encounters:  01/11/18 : 230 lb (104.3 kg)  01/09/ tear (2012); Retinal tear of right eye; Sebaceous cyst (2010); and Vitreous floaters (2012). He  has a past surgical history that includes Retinal laser procedure (8/7/12); tonsillectomy; and other surgical history.     His family history includes Cancer the telephone ring or doorbell:  No I have trouble hearing conversations in a noisy background such as a crowded room or restaurant:  No   I get confused about where sounds come from:  No I misunderstand some words in a sentence and need to ask people to r cardiology also, no new treatment. Follow up regular and CPM.     (Z98.890) History of repair of retinal tear by laser photocoagulation  Plan: addressed by ophtho    (E78.00) Hypercholesterolemia  Plan: CPM. Controlled. Check lab.      (G45.9) Transient cer (HgA1c) (%)   Date Value   12/08/2017 6.1 (H)       No flowsheet data found.     Fasting Blood Sugar (FSB)Annually   Glucose (mg/dL)   Date Value   12/08/2017 103 (H)   ----------  GLUCOSE (P) (mg/dL)   Date Value   01/28/2016 104 (H)   ----------       Maranda prescription benefits, but Medicare does not cover unless Medically needed    Zoster   Not covered by Medicare Part B No vaccine history found This may be covered with your pharmacy  prescription benefits      [address] [facility] Internal Lab or Pr

## 2018-01-11 NOTE — TELEPHONE ENCOUNTER
Pt requesting 6 month to a year refill on the following    •  Metoprolol Succinate ER 25 MG Oral Tablet 24 Hr, Take 0.5 tablets (12.5 mg total) by mouth daily. , Disp: 15 tablet, Rfl: 3

## 2018-01-12 ENCOUNTER — APPOINTMENT (OUTPATIENT)
Dept: LAB | Age: 78
End: 2018-01-12
Attending: FAMILY MEDICINE
Payer: MEDICARE

## 2018-01-12 DIAGNOSIS — Z13.6 SCREENING FOR CARDIOVASCULAR CONDITION: ICD-10-CM

## 2018-01-12 DIAGNOSIS — Z00.00 ENCOUNTER FOR ANNUAL HEALTH EXAMINATION: ICD-10-CM

## 2018-01-12 LAB
CHOLEST SERPL-MCNC: 132 MG/DL (ref 110–200)
HDLC SERPL-MCNC: 34 MG/DL
LDLC SERPL CALC-MCNC: 80 MG/DL (ref 0–99)
NONHDLC SERPL-MCNC: 98 MG/DL
PSA SERPL-MCNC: 2.9 NG/ML (ref 0–4)
TRIGL SERPL-MCNC: 89 MG/DL (ref 1–149)

## 2018-01-12 PROCEDURE — 80061 LIPID PANEL: CPT

## 2018-01-12 PROCEDURE — 36415 COLL VENOUS BLD VENIPUNCTURE: CPT

## 2018-03-06 ENCOUNTER — OFFICE VISIT (OUTPATIENT)
Dept: FAMILY MEDICINE CLINIC | Facility: CLINIC | Age: 78
End: 2018-03-06

## 2018-03-06 VITALS
DIASTOLIC BLOOD PRESSURE: 83 MMHG | HEART RATE: 69 BPM | SYSTOLIC BLOOD PRESSURE: 137 MMHG | BODY MASS INDEX: 35 KG/M2 | WEIGHT: 230 LBS

## 2018-03-06 DIAGNOSIS — M70.21 OLECRANON BURSITIS OF RIGHT ELBOW: Primary | ICD-10-CM

## 2018-03-06 PROCEDURE — 99213 OFFICE O/P EST LOW 20 MIN: CPT | Performed by: FAMILY MEDICINE

## 2018-03-06 PROCEDURE — G0463 HOSPITAL OUTPT CLINIC VISIT: HCPCS | Performed by: FAMILY MEDICINE

## 2018-03-06 PROCEDURE — 20605 DRAIN/INJ JOINT/BURSA W/O US: CPT | Performed by: FAMILY MEDICINE

## 2018-03-06 NOTE — PROCEDURES
Right olechrenon bursa , area cleaned with alcohol wipe, subcutaneous lidocaine introduced and changed to 18g needle, aspirated 10ml bloody fluid. Resolved swelling.

## 2018-03-06 NOTE — PROGRESS NOTES
HPI:    Patient ID: Brant Kim is a 78year old male. HPI  Patient presents with:  Lump: c/o lump on right elbow,  right elbow bump with mild tenderness. no recollection of injury. Review of Systems   Musculoskeletal: Positive for joint swelling.

## 2018-04-30 ENCOUNTER — OFFICE VISIT (OUTPATIENT)
Dept: FAMILY MEDICINE CLINIC | Facility: CLINIC | Age: 78
End: 2018-04-30

## 2018-04-30 VITALS
DIASTOLIC BLOOD PRESSURE: 91 MMHG | WEIGHT: 236 LBS | HEART RATE: 58 BPM | SYSTOLIC BLOOD PRESSURE: 150 MMHG | HEIGHT: 67.5 IN | BODY MASS INDEX: 36.61 KG/M2

## 2018-04-30 DIAGNOSIS — M76.32 ILIOTIBIAL BAND SYNDROME OF LEFT SIDE: ICD-10-CM

## 2018-04-30 DIAGNOSIS — L25.9 CONTACT DERMATITIS, UNSPECIFIED CONTACT DERMATITIS TYPE, UNSPECIFIED TRIGGER: Primary | ICD-10-CM

## 2018-04-30 DIAGNOSIS — R42 LIGHTHEADEDNESS: ICD-10-CM

## 2018-04-30 PROCEDURE — 99214 OFFICE O/P EST MOD 30 MIN: CPT | Performed by: FAMILY MEDICINE

## 2018-04-30 PROCEDURE — G0463 HOSPITAL OUTPT CLINIC VISIT: HCPCS | Performed by: FAMILY MEDICINE

## 2018-04-30 NOTE — PROGRESS NOTES
HPI:    Patient ID: Ha Kirkpatrick is a 78year old male. HPI  Patient presents with:  Derm Problem: right ankle, itching.  started last year on/off since then  Noticed lightheaded and nausea when performing some yard work leaning over and bending low priscilla Steroid cream. He wears mostly short socks. Instructed to try different brand. See Derm if not clearing up. Positional lightheadedness with forward bending. He can work out lift weights and do treadmill without problem.  Reviewed stress test and CT from l

## 2018-06-01 ENCOUNTER — TELEPHONE (OUTPATIENT)
Dept: OTHER | Age: 78
End: 2018-06-01

## 2018-06-01 NOTE — TELEPHONE ENCOUNTER
Pt's spouse, Tracie Schmidt, called on behalf of pt to schedule an appt. Pt has been complaining of right hip pain for about a week. He is able to ambulate and sleep, takes advil for the discomfort, no color or temperature change.  Appt was scheduled for this after

## 2018-06-08 ENCOUNTER — TELEPHONE (OUTPATIENT)
Dept: FAMILY MEDICINE CLINIC | Facility: CLINIC | Age: 78
End: 2018-06-08

## 2018-06-08 DIAGNOSIS — Z12.83 SKIN CANCER SCREENING: Primary | ICD-10-CM

## 2018-06-08 NOTE — TELEPHONE ENCOUNTER
Patient's wife called requesting a referral to follow up on a mole patient had removed 2016. Also, patient has a rash on right left.    Appt is scheduled for June 13 @ 11am.     Please sign off on referral request.     Thank you, Mick

## 2018-06-13 ENCOUNTER — OFFICE VISIT (OUTPATIENT)
Dept: DERMATOLOGY CLINIC | Facility: CLINIC | Age: 78
End: 2018-06-13

## 2018-06-13 DIAGNOSIS — D23.5 BENIGN NEOPLASM OF SKIN OF TRUNK, EXCEPT SCROTUM: ICD-10-CM

## 2018-06-13 DIAGNOSIS — D23.4 BENIGN NEOPLASM OF SCALP AND SKIN OF NECK: ICD-10-CM

## 2018-06-13 DIAGNOSIS — D23.70 BENIGN NEOPLASM OF SKIN OF LOWER LIMB, INCLUDING HIP, UNSPECIFIED LATERALITY: ICD-10-CM

## 2018-06-13 DIAGNOSIS — D23.60 BENIGN NEOPLASM OF SKIN OF UPPER LIMB, INCLUDING SHOULDER, UNSPECIFIED LATERALITY: ICD-10-CM

## 2018-06-13 DIAGNOSIS — D22.9 MULTIPLE NEVI: Primary | ICD-10-CM

## 2018-06-13 DIAGNOSIS — Z86.018 HISTORY OF DYSPLASTIC NEVUS: ICD-10-CM

## 2018-06-13 DIAGNOSIS — L82.1 SEBORRHEIC KERATOSES: ICD-10-CM

## 2018-06-13 DIAGNOSIS — D23.30 BENIGN NEOPLASM OF SKIN OF FACE: ICD-10-CM

## 2018-06-13 PROCEDURE — G0463 HOSPITAL OUTPT CLINIC VISIT: HCPCS | Performed by: DERMATOLOGY

## 2018-06-13 PROCEDURE — 99213 OFFICE O/P EST LOW 20 MIN: CPT | Performed by: DERMATOLOGY

## 2018-06-24 NOTE — PROGRESS NOTES
Riki Chaney is a 78year old male. HPI:     CC:  Patient presents with:  Full Skin Exam: LOV: 06/19/17. Pt presents for a full skin exam. Pt has personal hx of Dysplastic Nevus. Pt also c/o of rash on right ankle.         Allergies:  Patient has no known tablet Rfl: 5   Tadalafil (CIALIS) 20 MG Oral Tab Take 1 tablet (20 mg total) by mouth as needed for Erectile Dysfunction. Disp: 5 tablet Rfl: 5   Acidophilus/Pectin Oral Cap Take 1 capsule by mouth daily.  Disp:  Rfl:    aspirin 325 MG Oral Tab Take 325 mg Colon Cancer Father    • Other Vara Sermon Father    • Other Vara Sermon Sister      Retinitis Pigmentosia   • Macular degeneration Neg    • Retinal detachment Neg        There were no vitals filed for this visit.     HPI:    Patient presents with:  Full Skin Exam: including hip, unspecified laterality  Benign neoplasm of skin of trunk, except scrotum  Benign neoplasm of skin of upper limb, including shoulder, unspecified laterality  Seborrheic keratoses    See details on map.       Remarkable for:    No recurrence pr

## 2018-10-15 ENCOUNTER — TELEPHONE (OUTPATIENT)
Dept: CARDIOLOGY CLINIC | Facility: CLINIC | Age: 78
End: 2018-10-15

## 2018-10-15 NOTE — TELEPHONE ENCOUNTER
Pharm called and left mesage that pt med needs to be changed from metoprolol shingrix- pls advise is that is ok

## 2018-10-16 NOTE — TELEPHONE ENCOUNTER
Call placed to pharmacy to check what question was. S/w Franchesca Moreno states no record of call to change dose. They have Metoprolol Er 12.5 mg tab 1/2 tab daily is ready for  and we should disregard that call. Called Mr. Cruz he stated that he was told th

## 2019-01-03 PROBLEM — N18.30 CHRONIC KIDNEY DISEASE, STAGE 3 (HCC): Status: RESOLVED | Noted: 2018-01-10 | Resolved: 2019-01-03

## 2019-01-03 PROBLEM — I72.9 ANEURYSM (HCC): Status: ACTIVE | Noted: 2019-01-03

## 2019-01-21 PROBLEM — H25.13 NUCLEAR SCLEROTIC CATARACT OF BOTH EYES: Status: ACTIVE | Noted: 2019-01-21

## 2019-01-21 PROBLEM — H43.811 POSTERIOR VITREOUS DETACHMENT OF RIGHT EYE: Status: ACTIVE | Noted: 2019-01-21

## 2019-01-21 PROBLEM — H18.529 ABMD (ANTERIOR BASEMENT MEMBRANE DYSTROPHY): Status: ACTIVE | Noted: 2019-01-21

## 2019-01-21 PROBLEM — H02.834 DERMATOCHALASIS OF BOTH UPPER EYELIDS: Status: ACTIVE | Noted: 2019-01-21

## 2019-01-21 PROBLEM — H02.831 DERMATOCHALASIS OF BOTH UPPER EYELIDS: Status: ACTIVE | Noted: 2019-01-21

## 2019-01-30 PROBLEM — H35.371 MACULAR PUCKERING, RIGHT EYE: Status: ACTIVE | Noted: 2019-01-30

## 2019-02-08 PROBLEM — H02.831 DERMATOCHALASIS OF BOTH UPPER EYELIDS: Status: RESOLVED | Noted: 2019-01-21 | Resolved: 2019-02-08

## 2019-02-08 PROBLEM — I72.9 ANEURYSM (HCC): Status: RESOLVED | Noted: 2019-01-03 | Resolved: 2019-02-08

## 2019-02-08 PROBLEM — H02.834 DERMATOCHALASIS OF BOTH UPPER EYELIDS: Status: RESOLVED | Noted: 2019-01-21 | Resolved: 2019-02-08

## 2019-02-28 PROBLEM — Z96.1 PSEUDOPHAKIA OF RIGHT EYE: Status: ACTIVE | Noted: 2019-02-28

## 2019-02-28 PROBLEM — Z98.890 POSTSURGICAL STATE, EYE: Status: ACTIVE | Noted: 2019-02-28

## 2019-03-01 ENCOUNTER — MED REC SCAN ONLY (OUTPATIENT)
Dept: FAMILY MEDICINE CLINIC | Facility: CLINIC | Age: 79
End: 2019-03-01

## 2019-03-01 NOTE — PROGRESS NOTES
[facility] lab results done on 2/27/19, report put in blue folder for dr to review, report will be sent to scan

## 2019-04-11 PROBLEM — Z96.1 PSEUDOPHAKIA OF LEFT EYE: Status: ACTIVE | Noted: 2019-04-11

## 2019-09-27 ENCOUNTER — TELEPHONE (OUTPATIENT)
Dept: GASTROENTEROLOGY | Facility: CLINIC | Age: 79
End: 2019-09-27

## 2019-11-07 PROBLEM — Z96.1 BILATERAL PSEUDOPHAKIA: Status: ACTIVE | Noted: 2019-11-07

## 2019-11-07 PROBLEM — H02.831 DERMATOCHALASIS OF BOTH UPPER EYELIDS: Status: ACTIVE | Noted: 2019-11-07

## 2019-11-07 PROBLEM — H02.834 DERMATOCHALASIS OF BOTH UPPER EYELIDS: Status: ACTIVE | Noted: 2019-11-07

## 2020-01-06 PROBLEM — H02.834 DERMATOCHALASIS OF BOTH UPPER EYELIDS: Status: RESOLVED | Noted: 2019-11-07 | Resolved: 2020-01-06

## 2020-01-06 PROBLEM — I77.9 BILATERAL CAROTID ARTERY DISEASE, UNSPECIFIED TYPE (HCC): Status: ACTIVE | Noted: 2020-01-06

## 2020-01-06 PROBLEM — D69.6 THROMBOCYTOPENIA (HCC): Chronic | Status: RESOLVED | Noted: 2017-04-25 | Resolved: 2020-01-06

## 2020-01-06 PROBLEM — H02.831 DERMATOCHALASIS OF BOTH UPPER EYELIDS: Status: RESOLVED | Noted: 2019-11-07 | Resolved: 2020-01-06

## 2020-01-06 PROBLEM — Z96.1 BILATERAL PSEUDOPHAKIA: Status: RESOLVED | Noted: 2019-11-07 | Resolved: 2020-01-06

## 2020-01-06 PROBLEM — R73.03 PREDIABETES: Status: RESOLVED | Noted: 2018-01-10 | Resolved: 2020-01-06

## 2020-01-06 PROBLEM — Z96.1 PSEUDOPHAKIA OF RIGHT EYE: Status: RESOLVED | Noted: 2019-02-28 | Resolved: 2020-01-06

## 2020-01-06 PROBLEM — Z98.890 POSTSURGICAL STATE, EYE: Status: RESOLVED | Noted: 2019-02-28 | Resolved: 2020-01-06

## 2020-01-06 PROBLEM — Z96.1 PSEUDOPHAKIA OF LEFT EYE: Status: RESOLVED | Noted: 2019-04-11 | Resolved: 2020-01-06

## 2020-01-06 PROBLEM — G45.9 TIA (TRANSIENT ISCHEMIC ATTACK): Status: RESOLVED | Noted: 2017-05-18 | Resolved: 2020-01-06

## 2020-01-06 PROBLEM — H35.371 MACULAR PUCKERING, RIGHT EYE: Status: RESOLVED | Noted: 2019-01-30 | Resolved: 2020-01-06

## 2020-01-06 PROBLEM — H18.529 ABMD (ANTERIOR BASEMENT MEMBRANE DYSTROPHY): Status: RESOLVED | Noted: 2019-01-21 | Resolved: 2020-01-06

## 2020-01-06 PROBLEM — H43.811 POSTERIOR VITREOUS DETACHMENT OF RIGHT EYE: Status: RESOLVED | Noted: 2019-01-21 | Resolved: 2020-01-06

## 2020-01-06 PROBLEM — H25.13 NUCLEAR SCLEROTIC CATARACT OF BOTH EYES: Status: RESOLVED | Noted: 2019-01-21 | Resolved: 2020-01-06

## 2020-01-06 PROBLEM — Z83.3 FAMILY HISTORY OF DIABETES MELLITUS: Status: ACTIVE | Noted: 2020-01-06

## 2020-01-06 PROBLEM — G31.84 MCI (MILD COGNITIVE IMPAIRMENT): Status: ACTIVE | Noted: 2020-01-06

## 2020-01-13 PROBLEM — D69.2 SENILE PURPURA (HCC): Status: ACTIVE | Noted: 2020-01-13

## 2020-03-13 PROBLEM — D69.2 SENILE PURPURA (HCC): Status: RESOLVED | Noted: 2020-01-13 | Resolved: 2020-03-13

## 2020-04-20 RX ORDER — TRIAMCINOLONE ACETONIDE 1 MG/G
CREAM TOPICAL
Qty: 60 G | Refills: 0 | Status: SHIPPED | OUTPATIENT
Start: 2020-04-20 | End: 2022-01-19

## 2020-04-20 NOTE — TELEPHONE ENCOUNTER
Refill noted. Chart reviewed. Okay to fill times one with no additional refills. Refilled on behalf of Dr. Sloane Emerson.

## 2020-07-08 PROBLEM — N18.30 CKD (CHRONIC KIDNEY DISEASE) STAGE 3, GFR 30-59 ML/MIN (HCC): Status: ACTIVE | Noted: 2018-01-10

## 2020-07-22 ENCOUNTER — LAB ENCOUNTER (OUTPATIENT)
Dept: LAB | Age: 80
DRG: 454 | End: 2020-07-22
Attending: ORTHOPAEDIC SURGERY
Payer: MEDICARE

## 2020-07-22 ENCOUNTER — LAB ENCOUNTER (OUTPATIENT)
Dept: LAB | Facility: HOSPITAL | Age: 80
End: 2020-07-22
Attending: ORTHOPAEDIC SURGERY
Payer: MEDICARE

## 2020-07-22 DIAGNOSIS — Z01.818 PRE-OP TESTING: ICD-10-CM

## 2020-07-22 LAB
ANTIBODY SCREEN: NEGATIVE
RH BLOOD TYPE: POSITIVE

## 2020-07-22 PROCEDURE — 86850 RBC ANTIBODY SCREEN: CPT

## 2020-07-22 PROCEDURE — 36415 COLL VENOUS BLD VENIPUNCTURE: CPT

## 2020-07-22 PROCEDURE — 86900 BLOOD TYPING SEROLOGIC ABO: CPT

## 2020-07-22 PROCEDURE — 86901 BLOOD TYPING SEROLOGIC RH(D): CPT

## 2020-07-23 LAB — SARS-COV-2 RNA RESP QL NAA+PROBE: NOT DETECTED

## 2020-07-24 ENCOUNTER — HOSPITAL ENCOUNTER (INPATIENT)
Facility: HOSPITAL | Age: 80
LOS: 4 days | Discharge: HOME HEALTH | DRG: 454 | End: 2020-07-28
Attending: ORTHOPAEDIC SURGERY | Admitting: ORTHOPAEDIC SURGERY
Payer: MEDICARE

## 2020-07-24 ENCOUNTER — ANESTHESIA (OUTPATIENT)
Dept: SURGERY | Facility: HOSPITAL | Age: 80
DRG: 454 | End: 2020-07-24
Payer: MEDICARE

## 2020-07-24 ENCOUNTER — APPOINTMENT (OUTPATIENT)
Dept: GENERAL RADIOLOGY | Facility: HOSPITAL | Age: 80
DRG: 454 | End: 2020-07-24
Attending: ORTHOPAEDIC SURGERY
Payer: MEDICARE

## 2020-07-24 ENCOUNTER — ANESTHESIA EVENT (OUTPATIENT)
Dept: SURGERY | Facility: HOSPITAL | Age: 80
DRG: 454 | End: 2020-07-24
Payer: MEDICARE

## 2020-07-24 DIAGNOSIS — M48.062 NEUROGENIC CLAUDICATION DUE TO LUMBAR SPINAL STENOSIS: ICD-10-CM

## 2020-07-24 DIAGNOSIS — Z01.818 PRE-OP TESTING: Primary | ICD-10-CM

## 2020-07-24 DIAGNOSIS — M43.16 SPONDYLOLISTHESIS OF LUMBAR REGION: ICD-10-CM

## 2020-07-24 PROBLEM — M48.061 SPINAL STENOSIS OF LUMBAR REGION AT MULTIPLE LEVELS: Status: ACTIVE | Noted: 2020-07-24

## 2020-07-24 PROBLEM — M48.061 SPINAL STENOSIS OF LUMBAR REGION: Status: ACTIVE | Noted: 2020-07-24

## 2020-07-24 PROCEDURE — 4A11X4G MONITORING OF PERIPHERAL NERVOUS ELECTRICAL ACTIVITY, INTRAOPERATIVE, EXTERNAL APPROACH: ICD-10-PCS | Performed by: ORTHOPAEDIC SURGERY

## 2020-07-24 PROCEDURE — BR161ZZ FLUOROSCOPY OF LUMBAR FACET JOINT(S) USING LOW OSMOLAR CONTRAST: ICD-10-PCS | Performed by: ORTHOPAEDIC SURGERY

## 2020-07-24 PROCEDURE — 95860 NEEDLE EMG 1 EXTREMITY: CPT | Performed by: ORTHOPAEDIC SURGERY

## 2020-07-24 PROCEDURE — 3E0R3BZ INTRODUCTION OF ANESTHETIC AGENT INTO SPINAL CANAL, PERCUTANEOUS APPROACH: ICD-10-PCS | Performed by: ORTHOPAEDIC SURGERY

## 2020-07-24 PROCEDURE — 01NB0ZZ RELEASE LUMBAR NERVE, OPEN APPROACH: ICD-10-PCS | Performed by: ORTHOPAEDIC SURGERY

## 2020-07-24 PROCEDURE — 0SG00AJ FUSION OF LUMBAR VERTEBRAL JOINT WITH INTERBODY FUSION DEVICE, POSTERIOR APPROACH, ANTERIOR COLUMN, OPEN APPROACH: ICD-10-PCS | Performed by: ORTHOPAEDIC SURGERY

## 2020-07-24 PROCEDURE — 76000 FLUOROSCOPY <1 HR PHYS/QHP: CPT | Performed by: ORTHOPAEDIC SURGERY

## 2020-07-24 PROCEDURE — 0SG0071 FUSION OF LUMBAR VERTEBRAL JOINT WITH AUTOLOGOUS TISSUE SUBSTITUTE, POSTERIOR APPROACH, POSTERIOR COLUMN, OPEN APPROACH: ICD-10-PCS | Performed by: ORTHOPAEDIC SURGERY

## 2020-07-24 PROCEDURE — 95938 SOMATOSENSORY TESTING: CPT | Performed by: ORTHOPAEDIC SURGERY

## 2020-07-24 PROCEDURE — 0ST20ZZ RESECTION OF LUMBAR VERTEBRAL DISC, OPEN APPROACH: ICD-10-PCS | Performed by: ORTHOPAEDIC SURGERY

## 2020-07-24 DEVICE — 9.8-ASTRATICANNULATEDPOLY: Type: IMPLANTABLE DEVICE | Site: BACK | Status: FUNCTIONAL

## 2020-07-24 DEVICE — 9.1-6MMTIMISFIXEDLORDOSE: Type: IMPLANTABLE DEVICE | Site: BACK | Status: FUNCTIONAL

## 2020-07-24 DEVICE — 8.11-ORIOTRIOSTEOTRAPEZOIDAL: Type: IMPLANTABLE DEVICE | Site: BACK | Status: FUNCTIONAL

## 2020-07-24 DEVICE — FILLER BONE VOID 25X50X4MM 5CC: Type: IMPLANTABLE DEVICE | Site: BACK | Status: FUNCTIONAL

## 2020-07-24 DEVICE — SCREW ST T30  SPIN ASTRA: Type: IMPLANTABLE DEVICE | Site: BACK | Status: FUNCTIONAL

## 2020-07-24 DEVICE — CAGE SPNL 5D LORD TRPZD 26X9: Type: IMPLANTABLE DEVICE | Site: BACK | Status: FUNCTIONAL

## 2020-07-24 DEVICE — BONE GRAFT KIT 7510050 INFUSE XX SMALL
Type: IMPLANTABLE DEVICE | Site: BACK | Status: FUNCTIONAL
Brand: INFUSE® BONE GRAFT

## 2020-07-24 RX ORDER — SODIUM CHLORIDE, SODIUM LACTATE, POTASSIUM CHLORIDE, CALCIUM CHLORIDE 600; 310; 30; 20 MG/100ML; MG/100ML; MG/100ML; MG/100ML
INJECTION, SOLUTION INTRAVENOUS CONTINUOUS
Status: DISCONTINUED | OUTPATIENT
Start: 2020-07-24 | End: 2020-07-28

## 2020-07-24 RX ORDER — METOCLOPRAMIDE HYDROCHLORIDE 5 MG/ML
10 INJECTION INTRAMUSCULAR; INTRAVENOUS EVERY 6 HOURS PRN
Status: DISCONTINUED | OUTPATIENT
Start: 2020-07-24 | End: 2020-07-28

## 2020-07-24 RX ORDER — PHENYLEPHRINE HCL 10 MG/ML
VIAL (ML) INJECTION AS NEEDED
Status: DISCONTINUED | OUTPATIENT
Start: 2020-07-24 | End: 2020-07-24 | Stop reason: SURG

## 2020-07-24 RX ORDER — MAGNESIUM CARB/ALUMINUM HYDROX 105-160MG
296 TABLET,CHEWABLE ORAL ONCE AS NEEDED
Status: DISPENSED | OUTPATIENT
Start: 2020-07-24 | End: 2020-07-24

## 2020-07-24 RX ORDER — BUPIVACAINE HYDROCHLORIDE AND EPINEPHRINE 5; 5 MG/ML; UG/ML
INJECTION, SOLUTION PERINEURAL AS NEEDED
Status: DISCONTINUED | OUTPATIENT
Start: 2020-07-24 | End: 2020-07-24 | Stop reason: HOSPADM

## 2020-07-24 RX ORDER — METOPROLOL TARTRATE 5 MG/5ML
2.5 INJECTION INTRAVENOUS ONCE
Status: DISCONTINUED | OUTPATIENT
Start: 2020-07-24 | End: 2020-07-24 | Stop reason: HOSPADM

## 2020-07-24 RX ORDER — TAMSULOSIN HYDROCHLORIDE 0.4 MG/1
0.4 CAPSULE ORAL DAILY
Status: DISCONTINUED | OUTPATIENT
Start: 2020-07-24 | End: 2020-07-28

## 2020-07-24 RX ORDER — FAMOTIDINE 20 MG/1
20 TABLET ORAL ONCE
Status: COMPLETED | OUTPATIENT
Start: 2020-07-24 | End: 2020-07-24

## 2020-07-24 RX ORDER — HYDROMORPHONE HYDROCHLORIDE 1 MG/ML
0.4 INJECTION, SOLUTION INTRAMUSCULAR; INTRAVENOUS; SUBCUTANEOUS EVERY 5 MIN PRN
Status: DISCONTINUED | OUTPATIENT
Start: 2020-07-24 | End: 2020-07-24 | Stop reason: HOSPADM

## 2020-07-24 RX ORDER — MORPHINE SULFATE 1 MG/ML
INJECTION, SOLUTION EPIDURAL; INTRATHECAL; INTRAVENOUS AS NEEDED
Status: DISCONTINUED | OUTPATIENT
Start: 2020-07-24 | End: 2020-07-24 | Stop reason: HOSPADM

## 2020-07-24 RX ORDER — EPHEDRINE SULFATE 50 MG/ML
INJECTION, SOLUTION INTRAVENOUS AS NEEDED
Status: DISCONTINUED | OUTPATIENT
Start: 2020-07-24 | End: 2020-07-24 | Stop reason: SURG

## 2020-07-24 RX ORDER — HYDROMORPHONE HYDROCHLORIDE 1 MG/ML
0.6 INJECTION, SOLUTION INTRAMUSCULAR; INTRAVENOUS; SUBCUTANEOUS EVERY 5 MIN PRN
Status: DISCONTINUED | OUTPATIENT
Start: 2020-07-24 | End: 2020-07-24 | Stop reason: HOSPADM

## 2020-07-24 RX ORDER — BISACODYL 10 MG
10 SUPPOSITORY, RECTAL RECTAL
Status: DISCONTINUED | OUTPATIENT
Start: 2020-07-24 | End: 2020-07-28

## 2020-07-24 RX ORDER — HYDROCODONE BITARTRATE AND ACETAMINOPHEN 5; 325 MG/1; MG/1
1 TABLET ORAL EVERY 4 HOURS PRN
Status: DISCONTINUED | OUTPATIENT
Start: 2020-07-24 | End: 2020-07-28

## 2020-07-24 RX ORDER — LIDOCAINE HYDROCHLORIDE 10 MG/ML
INJECTION, SOLUTION EPIDURAL; INFILTRATION; INTRACAUDAL; PERINEURAL AS NEEDED
Status: DISCONTINUED | OUTPATIENT
Start: 2020-07-24 | End: 2020-07-24 | Stop reason: SURG

## 2020-07-24 RX ORDER — ONDANSETRON 2 MG/ML
4 INJECTION INTRAMUSCULAR; INTRAVENOUS EVERY 4 HOURS PRN
Status: DISPENSED | OUTPATIENT
Start: 2020-07-24 | End: 2020-07-25

## 2020-07-24 RX ORDER — SODIUM PHOSPHATE, DIBASIC AND SODIUM PHOSPHATE, MONOBASIC 7; 19 G/133ML; G/133ML
1 ENEMA RECTAL ONCE AS NEEDED
Status: DISCONTINUED | OUTPATIENT
Start: 2020-07-24 | End: 2020-07-28

## 2020-07-24 RX ORDER — HALOPERIDOL 5 MG/ML
0.25 INJECTION INTRAMUSCULAR ONCE AS NEEDED
Status: DISCONTINUED | OUTPATIENT
Start: 2020-07-24 | End: 2020-07-24 | Stop reason: HOSPADM

## 2020-07-24 RX ORDER — VANCOMYCIN HYDROCHLORIDE 1 G/20ML
INJECTION, POWDER, LYOPHILIZED, FOR SOLUTION INTRAVENOUS AS NEEDED
Status: DISCONTINUED | OUTPATIENT
Start: 2020-07-24 | End: 2020-07-24 | Stop reason: HOSPADM

## 2020-07-24 RX ORDER — MORPHINE SULFATE 4 MG/ML
4 INJECTION, SOLUTION INTRAMUSCULAR; INTRAVENOUS EVERY 10 MIN PRN
Status: DISCONTINUED | OUTPATIENT
Start: 2020-07-24 | End: 2020-07-24 | Stop reason: HOSPADM

## 2020-07-24 RX ORDER — HYDROCODONE BITARTRATE AND ACETAMINOPHEN 5; 325 MG/1; MG/1
2 TABLET ORAL EVERY 4 HOURS PRN
Status: DISCONTINUED | OUTPATIENT
Start: 2020-07-24 | End: 2020-07-28

## 2020-07-24 RX ORDER — HYDROCODONE BITARTRATE AND ACETAMINOPHEN 5; 325 MG/1; MG/1
2 TABLET ORAL AS NEEDED
Status: DISCONTINUED | OUTPATIENT
Start: 2020-07-24 | End: 2020-07-24 | Stop reason: HOSPADM

## 2020-07-24 RX ORDER — SODIUM CHLORIDE, SODIUM LACTATE, POTASSIUM CHLORIDE, CALCIUM CHLORIDE 600; 310; 30; 20 MG/100ML; MG/100ML; MG/100ML; MG/100ML
INJECTION, SOLUTION INTRAVENOUS CONTINUOUS
Status: DISCONTINUED | OUTPATIENT
Start: 2020-07-24 | End: 2020-07-24 | Stop reason: HOSPADM

## 2020-07-24 RX ORDER — HYDROMORPHONE HYDROCHLORIDE 1 MG/ML
INJECTION, SOLUTION INTRAMUSCULAR; INTRAVENOUS; SUBCUTANEOUS AS NEEDED
Status: DISCONTINUED | OUTPATIENT
Start: 2020-07-24 | End: 2020-07-24 | Stop reason: SURG

## 2020-07-24 RX ORDER — MIDAZOLAM HYDROCHLORIDE 1 MG/ML
INJECTION INTRAMUSCULAR; INTRAVENOUS AS NEEDED
Status: DISCONTINUED | OUTPATIENT
Start: 2020-07-24 | End: 2020-07-24 | Stop reason: SURG

## 2020-07-24 RX ORDER — CALCIUM CARBONATE 200(500)MG
500 TABLET,CHEWABLE ORAL 2 TIMES DAILY
Status: DISCONTINUED | OUTPATIENT
Start: 2020-07-24 | End: 2020-07-28

## 2020-07-24 RX ORDER — POLYETHYLENE GLYCOL 3350 17 G/17G
17 POWDER, FOR SOLUTION ORAL DAILY
Status: DISCONTINUED | OUTPATIENT
Start: 2020-07-24 | End: 2020-07-28

## 2020-07-24 RX ORDER — ACETAMINOPHEN 325 MG/1
650 TABLET ORAL EVERY 4 HOURS PRN
Status: DISCONTINUED | OUTPATIENT
Start: 2020-07-24 | End: 2020-07-28

## 2020-07-24 RX ORDER — ONDANSETRON 2 MG/ML
INJECTION INTRAMUSCULAR; INTRAVENOUS AS NEEDED
Status: DISCONTINUED | OUTPATIENT
Start: 2020-07-24 | End: 2020-07-24 | Stop reason: SURG

## 2020-07-24 RX ORDER — NALOXONE HYDROCHLORIDE 0.4 MG/ML
80 INJECTION, SOLUTION INTRAMUSCULAR; INTRAVENOUS; SUBCUTANEOUS AS NEEDED
Status: DISCONTINUED | OUTPATIENT
Start: 2020-07-24 | End: 2020-07-24 | Stop reason: HOSPADM

## 2020-07-24 RX ORDER — CEFAZOLIN SODIUM/WATER 2 G/20 ML
2 SYRINGE (ML) INTRAVENOUS EVERY 8 HOURS
Status: COMPLETED | OUTPATIENT
Start: 2020-07-24 | End: 2020-07-25

## 2020-07-24 RX ORDER — CEFAZOLIN SODIUM/WATER 2 G/20 ML
2 SYRINGE (ML) INTRAVENOUS ONCE
Status: COMPLETED | OUTPATIENT
Start: 2020-07-24 | End: 2020-07-24

## 2020-07-24 RX ORDER — TIZANIDINE 2 MG/1
2 TABLET ORAL 3 TIMES DAILY PRN
Status: DISCONTINUED | OUTPATIENT
Start: 2020-07-24 | End: 2020-07-28

## 2020-07-24 RX ORDER — HYDROCODONE BITARTRATE AND ACETAMINOPHEN 5; 325 MG/1; MG/1
1 TABLET ORAL AS NEEDED
Status: DISCONTINUED | OUTPATIENT
Start: 2020-07-24 | End: 2020-07-24 | Stop reason: HOSPADM

## 2020-07-24 RX ORDER — PROCHLORPERAZINE EDISYLATE 5 MG/ML
5 INJECTION INTRAMUSCULAR; INTRAVENOUS ONCE AS NEEDED
Status: DISCONTINUED | OUTPATIENT
Start: 2020-07-24 | End: 2020-07-24 | Stop reason: HOSPADM

## 2020-07-24 RX ORDER — DIPHENHYDRAMINE HCL 25 MG
25 CAPSULE ORAL EVERY 4 HOURS PRN
Status: DISCONTINUED | OUTPATIENT
Start: 2020-07-24 | End: 2020-07-28

## 2020-07-24 RX ORDER — HYDROMORPHONE HYDROCHLORIDE 2 MG/1
2 TABLET ORAL
Status: DISCONTINUED | OUTPATIENT
Start: 2020-07-24 | End: 2020-07-28

## 2020-07-24 RX ORDER — NALOXONE HYDROCHLORIDE 0.4 MG/ML
0.08 INJECTION, SOLUTION INTRAMUSCULAR; INTRAVENOUS; SUBCUTANEOUS
Status: DISCONTINUED | OUTPATIENT
Start: 2020-07-24 | End: 2020-07-28

## 2020-07-24 RX ORDER — ACETAMINOPHEN 500 MG
1000 TABLET ORAL ONCE
Status: COMPLETED | OUTPATIENT
Start: 2020-07-24 | End: 2020-07-24

## 2020-07-24 RX ORDER — MORPHINE SULFATE 4 MG/ML
2 INJECTION, SOLUTION INTRAMUSCULAR; INTRAVENOUS EVERY 10 MIN PRN
Status: DISCONTINUED | OUTPATIENT
Start: 2020-07-24 | End: 2020-07-24 | Stop reason: HOSPADM

## 2020-07-24 RX ORDER — DOCUSATE SODIUM 100 MG/1
100 CAPSULE, LIQUID FILLED ORAL 2 TIMES DAILY
Status: DISCONTINUED | OUTPATIENT
Start: 2020-07-24 | End: 2020-07-28

## 2020-07-24 RX ORDER — DIPHENHYDRAMINE HYDROCHLORIDE 50 MG/ML
25 INJECTION INTRAMUSCULAR; INTRAVENOUS EVERY 4 HOURS PRN
Status: DISCONTINUED | OUTPATIENT
Start: 2020-07-24 | End: 2020-07-28

## 2020-07-24 RX ORDER — ONDANSETRON 2 MG/ML
4 INJECTION INTRAMUSCULAR; INTRAVENOUS ONCE AS NEEDED
Status: DISCONTINUED | OUTPATIENT
Start: 2020-07-24 | End: 2020-07-24 | Stop reason: HOSPADM

## 2020-07-24 RX ORDER — PROCHLORPERAZINE EDISYLATE 5 MG/ML
10 INJECTION INTRAMUSCULAR; INTRAVENOUS EVERY 6 HOURS PRN
Status: ACTIVE | OUTPATIENT
Start: 2020-07-24 | End: 2020-07-26

## 2020-07-24 RX ORDER — NALBUPHINE HCL 10 MG/ML
4 AMPUL (ML) INJECTION EVERY 4 HOURS PRN
Status: DISCONTINUED | OUTPATIENT
Start: 2020-07-24 | End: 2020-07-28

## 2020-07-24 RX ORDER — SENNOSIDES 8.6 MG
17.2 TABLET ORAL NIGHTLY
Status: DISCONTINUED | OUTPATIENT
Start: 2020-07-24 | End: 2020-07-28

## 2020-07-24 RX ORDER — ATORVASTATIN CALCIUM 10 MG/1
10 TABLET, FILM COATED ORAL NIGHTLY
Status: DISCONTINUED | OUTPATIENT
Start: 2020-07-24 | End: 2020-07-28

## 2020-07-24 RX ORDER — ASCORBIC ACID 500 MG
1000 TABLET ORAL 2 TIMES DAILY
Status: DISCONTINUED | OUTPATIENT
Start: 2020-07-24 | End: 2020-07-28

## 2020-07-24 RX ORDER — DEXAMETHASONE SODIUM PHOSPHATE 4 MG/ML
VIAL (ML) INJECTION AS NEEDED
Status: DISCONTINUED | OUTPATIENT
Start: 2020-07-24 | End: 2020-07-24 | Stop reason: SURG

## 2020-07-24 RX ORDER — METOCLOPRAMIDE 10 MG/1
10 TABLET ORAL ONCE
Status: COMPLETED | OUTPATIENT
Start: 2020-07-24 | End: 2020-07-24

## 2020-07-24 RX ORDER — NALBUPHINE HCL 10 MG/ML
2.5 AMPUL (ML) INJECTION EVERY 4 HOURS PRN
Status: DISCONTINUED | OUTPATIENT
Start: 2020-07-24 | End: 2020-07-28

## 2020-07-24 RX ORDER — HYDROMORPHONE HYDROCHLORIDE 1 MG/ML
0.2 INJECTION, SOLUTION INTRAMUSCULAR; INTRAVENOUS; SUBCUTANEOUS EVERY 5 MIN PRN
Status: DISCONTINUED | OUTPATIENT
Start: 2020-07-24 | End: 2020-07-24 | Stop reason: HOSPADM

## 2020-07-24 RX ORDER — MORPHINE SULFATE 10 MG/ML
6 INJECTION, SOLUTION INTRAMUSCULAR; INTRAVENOUS EVERY 10 MIN PRN
Status: DISCONTINUED | OUTPATIENT
Start: 2020-07-24 | End: 2020-07-24 | Stop reason: HOSPADM

## 2020-07-24 RX ADMIN — PHENYLEPHRINE HCL 100 MCG: 10 MG/ML VIAL (ML) INJECTION at 09:02:00

## 2020-07-24 RX ADMIN — PHENYLEPHRINE HCL 100 MCG: 10 MG/ML VIAL (ML) INJECTION at 09:50:00

## 2020-07-24 RX ADMIN — PHENYLEPHRINE HCL 100 MCG: 10 MG/ML VIAL (ML) INJECTION at 08:00:00

## 2020-07-24 RX ADMIN — PHENYLEPHRINE HCL 100 MCG: 10 MG/ML VIAL (ML) INJECTION at 08:34:00

## 2020-07-24 RX ADMIN — SODIUM CHLORIDE, SODIUM LACTATE, POTASSIUM CHLORIDE, CALCIUM CHLORIDE: 600; 310; 30; 20 INJECTION, SOLUTION INTRAVENOUS at 11:07:00

## 2020-07-24 RX ADMIN — CEFAZOLIN SODIUM/WATER 2 G: 2 G/20 ML SYRINGE (ML) INTRAVENOUS at 08:00:00

## 2020-07-24 RX ADMIN — ONDANSETRON 4 MG: 2 INJECTION INTRAMUSCULAR; INTRAVENOUS at 07:49:00

## 2020-07-24 RX ADMIN — PHENYLEPHRINE HCL 100 MCG: 10 MG/ML VIAL (ML) INJECTION at 08:50:00

## 2020-07-24 RX ADMIN — PHENYLEPHRINE HCL 100 MCG: 10 MG/ML VIAL (ML) INJECTION at 10:31:00

## 2020-07-24 RX ADMIN — MIDAZOLAM HYDROCHLORIDE 2 MG: 1 INJECTION INTRAMUSCULAR; INTRAVENOUS at 07:29:00

## 2020-07-24 RX ADMIN — SODIUM CHLORIDE, SODIUM LACTATE, POTASSIUM CHLORIDE, CALCIUM CHLORIDE: 600; 310; 30; 20 INJECTION, SOLUTION INTRAVENOUS at 10:08:00

## 2020-07-24 RX ADMIN — EPHEDRINE SULFATE 5 MG: 50 INJECTION, SOLUTION INTRAVENOUS at 09:05:00

## 2020-07-24 RX ADMIN — CEFAZOLIN SODIUM/WATER 2 G: 2 G/20 ML SYRINGE (ML) INTRAVENOUS at 12:08:00

## 2020-07-24 RX ADMIN — PHENYLEPHRINE HCL 100 MCG: 10 MG/ML VIAL (ML) INJECTION at 08:07:00

## 2020-07-24 RX ADMIN — PHENYLEPHRINE HCL 100 MCG: 10 MG/ML VIAL (ML) INJECTION at 11:11:00

## 2020-07-24 RX ADMIN — SODIUM CHLORIDE, SODIUM LACTATE, POTASSIUM CHLORIDE, CALCIUM CHLORIDE: 600; 310; 30; 20 INJECTION, SOLUTION INTRAVENOUS at 12:32:00

## 2020-07-24 RX ADMIN — PHENYLEPHRINE HCL 100 MCG: 10 MG/ML VIAL (ML) INJECTION at 08:19:00

## 2020-07-24 RX ADMIN — EPHEDRINE SULFATE 5 MG: 50 INJECTION, SOLUTION INTRAVENOUS at 09:35:00

## 2020-07-24 RX ADMIN — LIDOCAINE HYDROCHLORIDE 80 MG: 10 INJECTION, SOLUTION EPIDURAL; INFILTRATION; INTRACAUDAL; PERINEURAL at 07:37:00

## 2020-07-24 RX ADMIN — HYDROMORPHONE HYDROCHLORIDE 0.5 MG: 1 INJECTION, SOLUTION INTRAMUSCULAR; INTRAVENOUS; SUBCUTANEOUS at 07:37:00

## 2020-07-24 RX ADMIN — DEXAMETHASONE SODIUM PHOSPHATE 8 MG: 4 MG/ML VIAL (ML) INJECTION at 07:49:00

## 2020-07-24 RX ADMIN — EPHEDRINE SULFATE 5 MG: 50 INJECTION, SOLUTION INTRAVENOUS at 09:13:00

## 2020-07-24 RX ADMIN — MIDAZOLAM HYDROCHLORIDE 2 MG: 1 INJECTION INTRAMUSCULAR; INTRAVENOUS at 08:00:00

## 2020-07-24 RX ADMIN — PHENYLEPHRINE HCL 100 MCG: 10 MG/ML VIAL (ML) INJECTION at 09:56:00

## 2020-07-24 RX ADMIN — EPHEDRINE SULFATE 5 MG: 50 INJECTION, SOLUTION INTRAVENOUS at 09:30:00

## 2020-07-24 RX ADMIN — PHENYLEPHRINE HCL 100 MCG: 10 MG/ML VIAL (ML) INJECTION at 10:45:00

## 2020-07-24 NOTE — ANESTHESIA PROCEDURE NOTES
Airway  Date/Time: 7/24/2020 7:45 AM  Urgency: Elective    Airway not difficult    General Information and Staff    Patient location during procedure: OR  Anesthesiologist: Marleen Moss MD  Resident/CRNA: Mary Marie CRNA  Performed: CRNA

## 2020-07-24 NOTE — H&P
[facility] Hospitalist Team  History and Physical  Admit Date:  7/24/20    ASSESSMENT / PLAN:   69 yo male with obesity-bmi 29, HTN, HL, CKD stage 3, hyperuricemia and lumbar spinal stenosis, spondylolisthesis who is S/P L2-L5 Laminectomy, L4-L5 MIS TLIF, see bel Medical History:   Diagnosis Date   • Back problem    • Bronchitis    • Calculus of kidney    • Choroidal nevus 2012 1/2 DD Flat choroidal nevus   • Colon polyp 2005   • Diverticulosis    • Dysplastic nevus 2016    right lower back   • Essential hyperte Oral Tab, TAKE ONE TABLET BY MOUTH ONCE DAILY, Disp: 90 tablet, Rfl: 3  simvastatin 20 MG Oral Tab, TAKE ONE TABLET BY MOUTH IN THE EVENING ONCE DAILY (Patient taking differently: Take 20 mg by mouth nightly.  TAKE ONE TABLET BY MOUTH IN THE EVENING ONCE DA present  MSK: Full range of motion in extremities, no clubbing, no cyanosis  Skin: no rashes or lesions  Neuro: AO*3, motor intact, no sensory deficits  Psyc: appropriate mood and affect    A/p:   Lumbar spinal stenosis S/P L2-L5 Laminectomy, L4-L5 MIS TLI

## 2020-07-24 NOTE — OPERATIVE REPORT
Pre-postop dx:  L2-5 spinal stenosis, L4-5 spondylolisthesis  Proc: L2-5 laminectomy, minimally invasive, with right L4-5 MIS TLIF, [employer] inst and cage, allograft, intrathecal block  Daren/Farzad TILLEY  Ebl: 50 cc  Drains: none  Complications: none

## 2020-07-24 NOTE — ANESTHESIA POSTPROCEDURE EVALUATION
Patient: Malinda Peng    Procedure Summary     Date:  07/24/20 Room / Location:  [facility] OR 09 / [facility] OR    Anesthesia Start:  2072 Anesthesia Stop:  5378    Procedure:  POSTERIOR LUMBAR INTERBODY FUSION - MIS TLIF 1 LEVEL (Right ) Diagnosis:       S
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
## 2020-07-24 NOTE — RESPIRATORY THERAPY NOTE
BRITTNEY ASSESSMENT:    Pt does not have a previous diagnosis of BRITTNEY. Pt does not routinely use a CPAP device at home.    CPAP INITIATION:    Pt to be placed on CPAP: no

## 2020-07-24 NOTE — H&P
Maryam Mccoy is a 70year old male who presents for a pre-operative physical exam. Patient is to have L4-5 minimally invasive lumbar fusion , to be done by Dr. Adalberto Dupree at [facility] on 7/24/2020.       HPI:             Current Outpatient [address] [place] [address] POWER: 19.5D   • CATARACT EXTRACTION W/  INTRAOCULAR LENS IMPLANT Left 04/10/2019     LENSX/ORA TORIC [id number] 19.5 D      Dr. Basil Thomas   • COLONOSCOPY   12/2019   • LUMBAR / TRANSFORAMINAL EPIDURAL STEROID INJECTION Left 6/3/2020     Performed by Shan Vanegas no chest tenderness  BREAST:deferred  LUNGS: clear to auscultation  CARDIO: RRR without murmur  GI: good BS's,no masses, HSM or tenderness  : deferred  RECTAL:deferred  MUSCULOSKELETAL: back is not tender,FROM of the back  EXTREMITIES: no cyanosis, clubb Family history of diabetes mellitus. Pt has no significant history of cardiac or pulmonary conditions.  Pt is a good surgical candidate

## 2020-07-24 NOTE — CM/SW NOTE
CM received MDO for dc planning. CM met with pt and wife at bedside for initial assessment. CM confirmed pts address and phone number. Pt lives in a 1 story house with no stairs with his wife.   He reports being independent with ADLS, ambulation and driv

## 2020-07-25 LAB
ANION GAP SERPL CALC-SCNC: 5 MMOL/L (ref 0–18)
BUN BLD-MCNC: 21 MG/DL (ref 7–18)
BUN/CREAT SERPL: 12.5 (ref 10–20)
CALCIUM BLD-MCNC: 8.1 MG/DL (ref 8.5–10.1)
CHLORIDE SERPL-SCNC: 105 MMOL/L (ref 98–112)
CO2 SERPL-SCNC: 29 MMOL/L (ref 21–32)
CREAT BLD-MCNC: 1.68 MG/DL (ref 0.7–1.3)
DEPRECATED RDW RBC AUTO: 46.2 FL (ref 35.1–46.3)
ERYTHROCYTE [DISTWIDTH] IN BLOOD BY AUTOMATED COUNT: 13.2 % (ref 11–15)
GLUCOSE BLD-MCNC: 135 MG/DL (ref 70–99)
HCT VFR BLD AUTO: 35.3 % (ref 39–53)
HGB BLD-MCNC: 11.8 G/DL (ref 13–17.5)
MCH RBC QN AUTO: 31.8 PG (ref 26–34)
MCHC RBC AUTO-ENTMCNC: 33.4 G/DL (ref 31–37)
MCV RBC AUTO: 95.1 FL (ref 80–100)
OSMOLALITY SERPL CALC.SUM OF ELEC: 293 MOSM/KG (ref 275–295)
PLATELET # BLD AUTO: 127 10(3)UL (ref 150–450)
POTASSIUM SERPL-SCNC: 4 MMOL/L (ref 3.5–5.1)
RBC # BLD AUTO: 3.71 X10(6)UL (ref 3.8–5.8)
SODIUM SERPL-SCNC: 139 MMOL/L (ref 136–145)
WBC # BLD AUTO: 11.4 X10(3) UL (ref 4–11)

## 2020-07-25 PROCEDURE — 97166 OT EVAL MOD COMPLEX 45 MIN: CPT

## 2020-07-25 PROCEDURE — 97535 SELF CARE MNGMENT TRAINING: CPT

## 2020-07-25 PROCEDURE — 80048 BASIC METABOLIC PNL TOTAL CA: CPT | Performed by: HOSPITALIST

## 2020-07-25 PROCEDURE — 97162 PT EVAL MOD COMPLEX 30 MIN: CPT

## 2020-07-25 PROCEDURE — 97116 GAIT TRAINING THERAPY: CPT

## 2020-07-25 PROCEDURE — 85027 COMPLETE CBC AUTOMATED: CPT | Performed by: HOSPITALIST

## 2020-07-25 NOTE — PROGRESS NOTES
[facility] Hospitalist Progress Note     CC: Hospital Follow up    PCP: Schuyler Wells MD       Assessment/Plan:     Principal Problem:    Spinal stenosis of lumbar region at multiple levels  Active Problems:    Spinal stenosis of lumbar region    69 yo male non-distended, +BS  MSK: strength 5/5 in all extremities  Neuro: Grossly normal, CN intact, sensory intact  Psych: Affect- normal  SKIN: warm, dry  EXT: no edema      Data Review:       Labs:     No results for input(s): RBC, HGB, HCT, MCV, MCH, MCHC, RDW,

## 2020-07-25 NOTE — OCCUPATIONAL THERAPY NOTE
OCCUPATIONAL THERAPY EVALUATION - INPATIENT      Room Number: 410/410-A  Evaluation Date: 7/25/2020  Type of Evaluation: Initial       Physician Order: IP Consult to Occupational Therapy  Reason for Therapy: ADL/IADL Dysfunction and Discharge Planning    O may benefit from [facility], however, Pt's wife will assist Pt prn, so [facility] is not anticipated.     DISCHARGE RECOMMENDATIONS          PLAN  OT Treatment Plan: ADL training;Functional transfer training;Patient/Family education;Patient/Family training;Equipment ev Layout: One level  Lives With: Spouse                     Drives: Yes  Patient Regularly Uses: None    Stairs in Home: one level  Assistive Device(s) Used: none     Prior Level of [place]: Pt lives w/ wife in a one story home, he has a comfort ht toil Transfer: NT  Chair Transfer: CGA  Car Transfer: NT    Bedroom Mobility: CGA w/ RW in room, Pt moves at slow pace w/ good RW technique    BALANCE ASSESSMENT  Static Sitting: mod I  Dynamic Sitting: mod I  Static Standing: CGA  Dynamic Standing: CGA    FUNC

## 2020-07-25 NOTE — PLAN OF CARE
Patient is alert and oriented X3. Childs in place. X1 Assist with RW, back brace. CMS intact to all extremities, Dressing is CDI. Denies SOB or nausea. States pain is minimal, will continue to monitor. Saline locked. VS Wnl, on 0.5L NC O2. Tele in place.  SC influences on pain and pain management  - Manage/alleviate anxiety  - Utilize distraction and/or relaxation techniques  - Monitor for opioid side effects  - Notify MD/LIP if interventions unsuccessful or patient reports new pain  - Anticipate increased magy post-hospital services based on physician/LIP order or complex needs related to functional status, cognitive ability or social support system  Outcome: Progressing

## 2020-07-25 NOTE — PHYSICAL THERAPY NOTE
PHYSICAL THERAPY EVALUATION - INPATIENT     Room Number: 410/410-A  Evaluation Date: 7/25/2020  Type of Evaluation: Initial   Physician Order: PT Eval and Treat    Presenting Problem: Spinal stenosis s/p L2-5 laminectomy, R L4-5 MIS TLIF  Reason for Thera mechanics; Endurance; Energy conservation;Patient education; Family education;Gait training;Stoop training;Transfer training;Balance training  Rehab Potential : Good  Frequency (Obs): Daily       PHYSICAL THERAPY MEDICAL/SOCIAL HISTORY     History related to Home: House   Home Layout: One level                Lives With: Spouse  Drives: Yes  Patient Owned Equipment: Cane  Patient Regularly Uses: None    Prior Level of [place]: Prior to admission, patient was independent with functional mobility, ADLs, and Nafisa     AM-PAC Score:  Raw Score: 18   Approx Degree of Impairment: 46.58%   Standardized Score (AM-PAC Scale): 43.63   CMS Modifier (G-Code): CK    FUNCTIONAL ABILITY STATUS  Gait Assessment   Gait Assistance: Minimum assistance  Distance (ft): 40  Ass

## 2020-07-25 NOTE — PROGRESS NOTES
S: Moderate back pain with no leg pain. Mild numbness to his left foot. His left foot feels stronger. No headaches. Upright in bed. Inspection:  Awake alert No acute distress.  No difficulty breathing     Blood pressure 92/59, pulse 87, temperature

## 2020-07-25 NOTE — OPERATIVE REPORT
[facility]    PATIENT'S NAME: Ewa Goldstein   ATTENDING PHYSICIAN: Damon Machado. Ayaka Medrano MD   OPERATING PHYSICIAN: Damon Machado.  Ayaka Medrano MD   PATIENT ACCOUNT#:   [id number]    LOCATION:  [address] [place] [address] Drive #:   [id number]       DATE OF BIRTH: leg symptoms, bleeding, infection, dural tear, paralysis, nonunion, degeneration level above or below, DVT, PE, and anesthetic risks. He appears to understand and has elected to proceed.       OPERATIVE TECHNIQUE:  The patient was given uncomplicated gener bilaterally. Once the L3 and L4 roots were free and clear, the area was covered with Floseal.  We then moved the retractor up to the L2-3 area on the right.   Again, we used a high-speed bur to thin out the medial aspect of the facet joint in the lamina, r performed with a 27-gauge Sprotte needle, 0.5 mg morphine, and 25 mcg fentanyl. SpineCraft screws were then placed on the right side and both sides compressed with the rods.   An intrathecal morphine and fentanyl block had been performed with a 27-gauge Sp

## 2020-07-26 ENCOUNTER — APPOINTMENT (OUTPATIENT)
Dept: GENERAL RADIOLOGY | Facility: HOSPITAL | Age: 80
DRG: 454 | End: 2020-07-26
Attending: HOSPITALIST
Payer: MEDICARE

## 2020-07-26 PROCEDURE — 97530 THERAPEUTIC ACTIVITIES: CPT

## 2020-07-26 PROCEDURE — 97116 GAIT TRAINING THERAPY: CPT

## 2020-07-26 PROCEDURE — 71046 X-RAY EXAM CHEST 2 VIEWS: CPT | Performed by: HOSPITALIST

## 2020-07-26 PROCEDURE — 97535 SELF CARE MNGMENT TRAINING: CPT

## 2020-07-26 RX ORDER — ASPIRIN 325 MG
325 TABLET ORAL DAILY
Qty: 1 TABLET | Refills: 0 | Status: SHIPPED | OUTPATIENT
Start: 2020-07-29

## 2020-07-26 RX ORDER — SODIUM CHLORIDE 9 MG/ML
INJECTION, SOLUTION INTRAVENOUS CONTINUOUS
Status: DISCONTINUED | OUTPATIENT
Start: 2020-07-26 | End: 2020-07-28

## 2020-07-26 NOTE — PLAN OF CARE
Problem: Patient Centered Care  Goal: Patient preferences are identified and integrated in the patient's plan of care  Description  Interventions:  - What would you like us to know as we care for you?   - Provide timely, complete, and accurate informatio non-pharmacological measures as appropriate and evaluate response  - Consider cultural and social influences on pain and pain management  - Manage/alleviate anxiety  - Utilize distraction and/or relaxation techniques  - Monitor for opioid side effects  - N discharge learning needs (meds, wound care, etc)  - Arrange for interpreters to assist at discharge as needed  - Consider post-discharge preferences of patient/family/discharge partner  - Complete POLST form as appropriate  - Assess patient's ability to be

## 2020-07-26 NOTE — PROGRESS NOTES
S: moderate back pain with no leg pain today. Left leg weakness noted. No headaches. He worked with PT yesterday morning, but had increased pain in the afternoon. + flatus but no BM yet. Voiding without difficulty.   Still some numbness to his left foot

## 2020-07-26 NOTE — OCCUPATIONAL THERAPY NOTE
OCCUPATIONAL THERAPY TREATMENT NOTE - INPATIENT    Room Number: 410/410-A               Problem List  Principal Problem:    Spinal stenosis of lumbar region at multiple levels  Active Problems:    Spinal stenosis of lumbar region      OCCUPATIONAL THERAPY training;Functional transfer training;Patient/Family education;Patient/Family training;Equipment eval/education    SUBJECTIVE  \"It just hurts on the sides of my back\"     OBJECTIVE  Precautions: Lumbar brace;Spine    WEIGHT BEARING RESTRICTION  Weight Be recall spine precautions  Comment: recalls 2/3   Patient will complete log roll mod I Comment: min a      Goals  on:   Frequency: 3-5x/week    [facility]/R  [address] [place] [address]  #[id number]

## 2020-07-26 NOTE — PROGRESS NOTES
[facility] Hospitalist Progress Note     CC: Hospital Follow up    PCP: Amber Potter MD       Assessment/Plan:     Principal Problem:    Spinal stenosis of lumbar region at multiple levels  Active Problems:    Spinal stenosis of lumbar region    71 yo male 2+ peripheral pulses  ABD: Soft, non-tender, non-distended, +BS  MSK: strength 5/5 in all extremities  Neuro: Grossly normal, CN intact, sensory intact  Psych: Affect- normal  SKIN: warm, dry  EXT: no edema      Data Review:       Labs:     Recent Labs   L

## 2020-07-26 NOTE — PHYSICAL THERAPY NOTE
PHYSICAL THERAPY TREATMENT NOTE - INPATIENT     Room Number: 303/805-B       Presenting Problem: Spinal stenosis s/p L2-5 laminectomy, R L4-5 MIS TLIF    Problem List  Principal Problem:    Spinal stenosis of lumbar region at multiple levels  Active Proble promotion; Body mechanics;Repositioning    BALANCE                                                                                                                     Static Sitting: Good  Dynamic Sitting: Fair +           Static Standing: Fair  Dynamic Sta with walker - rolling     Goal #2  Current Status Min assist   Goal #3 Patient is able to ambulate 300 feet with assist device: walker - rolling at assistance level: modified independent   Goal #3   Current Status CGA   Goal #4 Patient will negotiate 1 sta

## 2020-07-27 LAB
ANION GAP SERPL CALC-SCNC: 6 MMOL/L (ref 0–18)
BUN BLD-MCNC: 9 MG/DL (ref 7–18)
BUN/CREAT SERPL: 8.3 (ref 10–20)
CALCIUM BLD-MCNC: 8.7 MG/DL (ref 8.5–10.1)
CHLORIDE SERPL-SCNC: 103 MMOL/L (ref 98–112)
CO2 SERPL-SCNC: 28 MMOL/L (ref 21–32)
CREAT BLD-MCNC: 1.08 MG/DL (ref 0.7–1.3)
DEPRECATED RDW RBC AUTO: 43.6 FL (ref 35.1–46.3)
ERYTHROCYTE [DISTWIDTH] IN BLOOD BY AUTOMATED COUNT: 12.8 % (ref 11–15)
GLUCOSE BLD-MCNC: 148 MG/DL (ref 70–99)
HCT VFR BLD AUTO: 35.3 % (ref 39–53)
HGB BLD-MCNC: 12.1 G/DL (ref 13–17.5)
MCH RBC QN AUTO: 31.8 PG (ref 26–34)
MCHC RBC AUTO-ENTMCNC: 34.3 G/DL (ref 31–37)
MCV RBC AUTO: 92.7 FL (ref 80–100)
OSMOLALITY SERPL CALC.SUM OF ELEC: 285 MOSM/KG (ref 275–295)
PLATELET # BLD AUTO: 152 10(3)UL (ref 150–450)
POTASSIUM SERPL-SCNC: 4 MMOL/L (ref 3.5–5.1)
RBC # BLD AUTO: 3.81 X10(6)UL (ref 3.8–5.8)
SODIUM SERPL-SCNC: 137 MMOL/L (ref 136–145)
WBC # BLD AUTO: 11.4 X10(3) UL (ref 4–11)

## 2020-07-27 PROCEDURE — 85027 COMPLETE CBC AUTOMATED: CPT | Performed by: NURSE PRACTITIONER

## 2020-07-27 PROCEDURE — 97110 THERAPEUTIC EXERCISES: CPT

## 2020-07-27 PROCEDURE — 80048 BASIC METABOLIC PNL TOTAL CA: CPT | Performed by: NURSE PRACTITIONER

## 2020-07-27 PROCEDURE — 97116 GAIT TRAINING THERAPY: CPT

## 2020-07-27 NOTE — PHYSICAL THERAPY NOTE
PHYSICAL THERAPY TREATMENT NOTE - INPATIENT     Room Number: 330/933-B       Presenting Problem: Spinal stenosis s/p L2-5 laminectomy, R L4-5 MIS TLIF    Problem List  Principal Problem:    Spinal stenosis of lumbar region at multiple levels  Active Proble down on and standing up from a chair with arms (e.g., wheelchair, bedside commode, etc.): A Little   -   Moving from lying on back to sitting on the side of the bed?: A Little   How much help from another person does the patient currently need. ..   -   Mov

## 2020-07-27 NOTE — OCCUPATIONAL THERAPY NOTE
OCCUPATIONAL THERAPY TREATMENT NOTE - INPATIENT        Room Number: 410/410-A                Problem List  Principal Problem:    Spinal stenosis of lumbar region at multiple levels  Active Problems:    Spinal stenosis of lumbar region      OCCUPATIONAL THE LIVING ASSESSMENT  AM-PAC ‘6-Clicks’ Inpatient Daily Activity Short Form  How much help from another person does the patient currently need…  -   Putting on and taking off regular lower body clothing?: A Lot  -   Bathing (including washing, rinsing, drying

## 2020-07-27 NOTE — CM/SW NOTE
SW followed up on DC plan and met with pt and pt's wife at bedside. Pt states he does not believe he needs HHC at this time. Pt would have family support if needs additional assistance.  BARBARA advised patient if they need HHC in the future, the PCP office

## 2020-07-27 NOTE — PLAN OF CARE
Problem: PAIN - ADULT  Goal: Verbalizes/displays adequate comfort level or patient's stated pain goal  Description  INTERVENTIONS:  - Encourage pt to monitor pain and request assistance  - Assess pain using appropriate pain scale  - Administer analgesics appropriate  - Identify discharge learning needs (meds, wound care, etc)  - Arrange for interpreters to assist at discharge as needed  - Consider post-discharge preferences of patient/family/discharge partner  - Complete POLST form as appropriate  - Assess Pt ambulates with LSO brace/walker and 1 assist. Unchanged L leg weakness. Pain well controlled with Norco prn. Colace and miralax held d/t loose BMs. Childs cath to be dc'd today at 2pm.   Plan: home with [place] after cleared by PT and able to urinate.

## 2020-07-28 VITALS
HEART RATE: 87 BPM | RESPIRATION RATE: 18 BRPM | OXYGEN SATURATION: 96 % | WEIGHT: 232 LBS | DIASTOLIC BLOOD PRESSURE: 88 MMHG | TEMPERATURE: 99 F | HEIGHT: 69 IN | BODY MASS INDEX: 34.36 KG/M2 | SYSTOLIC BLOOD PRESSURE: 157 MMHG

## 2020-07-28 PROCEDURE — 97116 GAIT TRAINING THERAPY: CPT

## 2020-07-28 PROCEDURE — 97110 THERAPEUTIC EXERCISES: CPT

## 2020-07-28 NOTE — CM/SW NOTE
7/28 307pm: The pt. Has been accepted and reserved for [facility]. [facility] is aware of discharge and will follow up with the pt. At home.   -------------------  7/28 1232pm: Plan is for discharge home today 7/28 and the pt.  And his wife ar

## 2020-07-28 NOTE — HOME CARE LIAISON
Received referral from [address] unable to accept due to staffing shortage in patient's service area. [facility] notified.  Thank you for this referral.

## 2020-07-28 NOTE — PLAN OF CARE
Problem: Patient Centered Care  Goal: Patient preferences are identified and integrated in the patient's plan of care  Description  Interventions:  - What would you like us to know as we care for you? I WOULD LIKE HOME HEALTH CARE WHEN I LEAVE.   - Provide interventions unsuccessful or patient reports new pain  - Anticipate increased pain with activity and pre-medicate as appropriate  Outcome: Progressing     Problem: RISK FOR INFECTION - ADULT  Goal: Absence of fever/infection during anticipated neutropenic GENITOURINARY - ADULT  Goal: Absence of urinary retention  Description  INTERVENTIONS:  - Assess patient’s ability to void and empty bladder  - Monitor intake/output and perform bladder scan as needed  - Follow urinary retention protocol/standard of care CONTINUE TO MONITOR. PATIENT RESTING IN BED WITH CALL LIGHT WITHIN REACH. PATIENT'S PLAN FOR DISCHARGE TO [facility].

## 2020-07-28 NOTE — DISCHARGE SUMMARY
[facility] Internal Medicine Discharge Summary   Patient ID:  Gabriela Davila  [id number]  9 year old  11/16/1940    Admit date: 7/24/2020    Discharge date and time: 7/28/2020     Attending Physician: Luca Bowie MD     Primary Care Physician: Mamadou Huynh Resp:    Temp:        Exam on day of discharge:  Feels belly full. Voiding.  5 loose stool yesterday per pt     Gen: No acute distress, alert and oriented  CV: RRR, +s1/s2  Lungs: CTAB, good respiratory effort  Abdomen: s/nt/nd  Ext: Moves all 4 extremiti is not enlarged. There is mild tortuosity of the thoracic aorta with peripheral atherosclerotic vascular calcification. The pulmonary vascularity is within normal limits. MEDIAST/ANISHA: No visible mass or adenopathy.  LUNGS/PLEURA: Low lung volumes are demon pt/wife/son.  D/w MD Brant Teixeira  [phone]  7/28/2020  10:16 AM

## 2020-07-28 NOTE — PHYSICAL THERAPY NOTE
PHYSICAL THERAPY TREATMENT NOTE - INPATIENT     Room Number: 179/476-D       Presenting Problem: Spinal stenosis s/p L2-5 laminectomy, R L4-5 MIS TLIF    Problem List  Principal Problem:    Spinal stenosis of lumbar region at multiple levels  Active Proble bedclothes, sheets and blankets)?: A Little   -   Sitting down on and standing up from a chair with arms (e.g., wheelchair, bedside commode, etc.): A Little   -   Moving from lying on back to sitting on the side of the bed?: A Little   How much help from a #6  Current Status

## 2020-07-28 NOTE — PLAN OF CARE
Problem: MUSCULOSKELETAL - ADULT  Goal: Return mobility to safest level of function  Description  INTERVENTIONS:  - Assess patient stability and activity tolerance for standing, transferring and ambulating w/ or w/o assistive devices  - Assist with trans Identify cognitive and physical deficits and behaviors that affect risk of falls.   - [place] fall precautions as indicated by assessment.  - Educate pt/family on patient safety including physical limitations  - Instruct pt to call for assistance with act complications. Pt discharged home with [facility] as per PT recommendations.

## 2020-08-03 PROBLEM — Z98.1 S/P LUMBAR FUSION: Status: ACTIVE | Noted: 2020-08-03

## 2020-08-06 PROBLEM — E78.00 HYPERCHOLESTEROLEMIA: Status: RESOLVED | Noted: 2017-01-23 | Resolved: 2020-08-06

## 2020-08-06 PROBLEM — Z83.3 FAMILY HISTORY OF DIABETES MELLITUS: Status: RESOLVED | Noted: 2020-01-06 | Resolved: 2020-08-06

## 2020-08-06 PROBLEM — N18.30 CKD (CHRONIC KIDNEY DISEASE) STAGE 3, GFR 30-59 ML/MIN (HCC): Status: RESOLVED | Noted: 2018-01-10 | Resolved: 2020-08-06

## 2020-08-06 PROBLEM — E66.01 SEVERE OBESITY (BMI 35.0-39.9) WITH COMORBIDITY (HCC): Chronic | Status: RESOLVED | Noted: 2017-04-25 | Resolved: 2020-08-06

## 2020-10-01 PROBLEM — N52.02 CORPORO-VENOUS OCCLUSIVE ERECTILE DYSFUNCTION: Status: ACTIVE | Noted: 2020-10-01

## 2020-10-01 PROBLEM — Z87.442 PERSONAL HISTORY OF KIDNEY STONES: Status: ACTIVE | Noted: 2020-10-01

## 2021-01-14 PROBLEM — I65.23 ATHEROSCLEROSIS OF BOTH CAROTID ARTERIES: Status: ACTIVE | Noted: 2020-01-06

## 2021-01-14 PROBLEM — I77.1 TORTUOUS AORTA (HCC): Status: ACTIVE | Noted: 2021-01-14

## 2021-01-14 PROBLEM — I70.0 AORTIC ATHEROSCLEROSIS (HCC): Status: ACTIVE | Noted: 2021-01-14

## 2021-01-15 PROBLEM — I50.32 CHRONIC DIASTOLIC CHF (CONGESTIVE HEART FAILURE) (HCC): Status: ACTIVE | Noted: 2021-01-15

## 2021-01-15 PROBLEM — I77.9 BILATERAL CAROTID ARTERY DISEASE (HCC): Status: ACTIVE | Noted: 2021-01-15

## 2021-01-15 PROBLEM — M47.816 ARTHRITIS OF FACET JOINT OF LUMBAR SPINE: Status: ACTIVE | Noted: 2021-01-15

## 2021-02-06 DIAGNOSIS — Z23 NEED FOR VACCINATION: ICD-10-CM

## 2021-05-18 NOTE — PROGRESS NOTES
[facility] Hospitalist Progress Note     CC: Hospital Follow up    PCP: Sisi Pederson MD       Assessment/Plan:     Principal Problem:    Spinal stenosis of lumbar region at multiple levels  Active Problems:    Spinal stenosis of lumbar region    71 yo male kg)  06/03/20 1333 : 230 lb (104.3 kg)      Exam   GEN: NAD  HEENT: EOMI, PERRLA  Neck: Supple, no JVD  Pulm: CTAB, no crackles or wheezes  CV: RRR, no murmurs, 2+ peripheral pulses  ABD: Soft, non-tender, non-distended, +BS  MSK: strength 5/5 in all extre **OR** HYDROcodone-acetaminophen **OR** HYDROcodone-acetaminophen, tiZANidine HCl, HYDROmorphone HCl, Naloxone HCl, Nalbuphine HCl **OR** Nalbuphine HCl Tissue Cultured Epidermal Autograft Text: The defect edges were debeveled with a #15 scalpel blade.  Given the location of the defect, shape of the defect and the proximity to free margins a tissue cultured epidermal autograft was deemed most appropriate.  The graft was then trimmed to fit the size of the defect.  The graft was then placed in the primary defect and oriented appropriately.

## 2021-10-13 PROBLEM — I77.9 CAROTID ARTERY DISEASE WITHOUT CEREBRAL INFARCTION (HCC): Status: ACTIVE | Noted: 2021-01-15

## 2021-10-13 PROBLEM — M47.816 ARTHRITIS OF FACET JOINT OF LUMBAR SPINE: Status: RESOLVED | Noted: 2021-01-15 | Resolved: 2021-10-13

## 2021-10-13 PROBLEM — M46.96 UNSPECIFIED INFLAMMATORY SPONDYLOPATHY, LUMBAR REGION (HCC): Status: ACTIVE | Noted: 2021-10-13

## 2021-11-19 PROBLEM — M54.16 BILATERAL LUMBAR RADICULOPATHY: Status: ACTIVE | Noted: 2021-11-19

## 2021-11-24 PROBLEM — M48.062 NEUROGENIC CLAUDICATION DUE TO LUMBAR SPINAL STENOSIS: Status: ACTIVE | Noted: 2021-11-24

## 2021-11-24 PROBLEM — G89.29 CHRONIC BILATERAL LOW BACK PAIN WITHOUT SCIATICA: Status: ACTIVE | Noted: 2021-11-24

## 2021-11-24 PROBLEM — M48.061 SPINAL STENOSIS OF LUMBAR REGION: Status: RESOLVED | Noted: 2020-07-24 | Resolved: 2021-11-24

## 2021-11-24 PROBLEM — M54.50 CHRONIC BILATERAL LOW BACK PAIN WITHOUT SCIATICA: Status: ACTIVE | Noted: 2021-11-24

## 2021-11-24 PROBLEM — M48.061 SPINAL STENOSIS OF LUMBAR REGION AT MULTIPLE LEVELS: Status: RESOLVED | Noted: 2020-07-24 | Resolved: 2021-11-24

## 2022-01-13 PROBLEM — F03.90 MILD DEMENTIA (HCC): Status: ACTIVE | Noted: 2022-01-13

## 2022-01-13 PROBLEM — F03.A0 MILD DEMENTIA (HCC): Status: ACTIVE | Noted: 2022-01-13

## 2022-07-18 RX ORDER — FUROSEMIDE 20 MG/1
20 TABLET ORAL DAILY
COMMUNITY
Start: 2022-06-13 | End: 2023-01-09

## 2022-07-18 RX ORDER — PROPRANOLOL HYDROCHLORIDE 10 MG/1
10 TABLET ORAL 2 TIMES DAILY
COMMUNITY
Start: 2022-05-12

## 2022-07-23 ENCOUNTER — LAB ENCOUNTER (OUTPATIENT)
Dept: LAB | Age: 82
End: 2022-07-23
Attending: INTERNAL MEDICINE
Payer: MEDICARE

## 2022-07-23 ENCOUNTER — NURSE ONLY (OUTPATIENT)
Dept: LAB | Age: 82
End: 2022-07-23
Attending: INTERNAL MEDICINE
Payer: MEDICARE

## 2022-07-23 DIAGNOSIS — Z01.818 PRE-OP TESTING: ICD-10-CM

## 2022-07-23 LAB
ANION GAP SERPL CALC-SCNC: 3 MMOL/L (ref 0–18)
BUN BLD-MCNC: 17 MG/DL (ref 7–18)
BUN/CREAT SERPL: 12.8 (ref 10–20)
CALCIUM BLD-MCNC: 9.1 MG/DL (ref 8.5–10.1)
CHLORIDE SERPL-SCNC: 109 MMOL/L (ref 98–112)
CO2 SERPL-SCNC: 30 MMOL/L (ref 21–32)
CREAT BLD-MCNC: 1.33 MG/DL
DEPRECATED RDW RBC AUTO: 42.5 FL (ref 35.1–46.3)
ERYTHROCYTE [DISTWIDTH] IN BLOOD BY AUTOMATED COUNT: 12.3 % (ref 11–15)
FASTING STATUS PATIENT QL REPORTED: YES
GLUCOSE BLD-MCNC: 120 MG/DL (ref 70–99)
HCT VFR BLD AUTO: 46 %
HGB BLD-MCNC: 15.1 G/DL
MCH RBC QN AUTO: 31.1 PG (ref 26–34)
MCHC RBC AUTO-ENTMCNC: 32.8 G/DL (ref 31–37)
MCV RBC AUTO: 94.8 FL
OSMOLALITY SERPL CALC.SUM OF ELEC: 297 MOSM/KG (ref 275–295)
PLATELET # BLD AUTO: 131 10(3)UL (ref 150–450)
POTASSIUM SERPL-SCNC: 4.6 MMOL/L (ref 3.5–5.1)
RBC # BLD AUTO: 4.85 X10(6)UL
SODIUM SERPL-SCNC: 142 MMOL/L (ref 136–145)
WBC # BLD AUTO: 4.7 X10(3) UL (ref 4–11)

## 2022-07-23 PROCEDURE — 85027 COMPLETE CBC AUTOMATED: CPT

## 2022-07-23 PROCEDURE — 80048 BASIC METABOLIC PNL TOTAL CA: CPT

## 2022-07-23 PROCEDURE — 36415 COLL VENOUS BLD VENIPUNCTURE: CPT

## 2022-07-24 LAB — SARS-COV-2 RNA RESP QL NAA+PROBE: NOT DETECTED

## 2022-07-26 ENCOUNTER — HOSPITAL ENCOUNTER (OUTPATIENT)
Dept: INTERVENTIONAL RADIOLOGY/VASCULAR | Facility: HOSPITAL | Age: 82
Discharge: HOME | End: 2022-07-26
Attending: INTERNAL MEDICINE | Admitting: INTERNAL MEDICINE
Payer: MEDICARE

## 2022-07-26 VITALS
HEART RATE: 52 BPM | SYSTOLIC BLOOD PRESSURE: 130 MMHG | WEIGHT: 208 LBS | HEIGHT: 69 IN | TEMPERATURE: 97 F | RESPIRATION RATE: 14 BRPM | BODY MASS INDEX: 30.81 KG/M2 | OXYGEN SATURATION: 97 % | DIASTOLIC BLOOD PRESSURE: 82 MMHG

## 2022-07-26 DIAGNOSIS — Z01.818 PRE-OP TESTING: Primary | ICD-10-CM

## 2022-07-26 DIAGNOSIS — R07.9 CHEST PAIN: ICD-10-CM

## 2022-07-26 PROCEDURE — B2151ZZ FLUOROSCOPY OF LEFT HEART USING LOW OSMOLAR CONTRAST: ICD-10-PCS | Performed by: INTERNAL MEDICINE

## 2022-07-26 PROCEDURE — 4A023N8 MEASUREMENT OF CARDIAC SAMPLING AND PRESSURE, BILATERAL, PERCUTANEOUS APPROACH: ICD-10-PCS | Performed by: INTERNAL MEDICINE

## 2022-07-26 PROCEDURE — 93460 R&L HRT ART/VENTRICLE ANGIO: CPT

## 2022-07-26 PROCEDURE — 36415 COLL VENOUS BLD VENIPUNCTURE: CPT

## 2022-07-26 PROCEDURE — 99152 MOD SED SAME PHYS/QHP 5/>YRS: CPT

## 2022-07-26 PROCEDURE — B2111ZZ FLUOROSCOPY OF MULTIPLE CORONARY ARTERIES USING LOW OSMOLAR CONTRAST: ICD-10-PCS | Performed by: INTERNAL MEDICINE

## 2022-07-26 RX ORDER — MIDAZOLAM HYDROCHLORIDE 1 MG/ML
INJECTION INTRAMUSCULAR; INTRAVENOUS
Status: COMPLETED
Start: 2022-07-26 | End: 2022-07-26

## 2022-07-26 RX ORDER — SODIUM CHLORIDE 9 MG/ML
INJECTION, SOLUTION INTRAVENOUS
Status: DISCONTINUED | OUTPATIENT
Start: 2022-07-26 | End: 2022-07-26

## 2022-07-26 RX ORDER — LIDOCAINE HYDROCHLORIDE 20 MG/ML
INJECTION, SOLUTION EPIDURAL; INFILTRATION; INTRACAUDAL; PERINEURAL
Status: COMPLETED
Start: 2022-07-26 | End: 2022-07-26

## 2022-07-26 RX ORDER — SODIUM CHLORIDE 9 MG/ML
1 INJECTION, SOLUTION INTRAVENOUS CONTINUOUS
Status: DISCONTINUED | OUTPATIENT
Start: 2022-07-26 | End: 2022-07-26

## 2022-07-26 RX ADMIN — SODIUM CHLORIDE 1 ML/KG/HR: 9 INJECTION, SOLUTION INTRAVENOUS at 07:15:00

## 2022-07-26 NOTE — IVS NOTE
DISCHARGE NOTE     Pt is able to sit up and ambulate without difficulty. Pt voided and tolerated fluids and food. Procedural site remains dry and intact with good circulation, motion, and sensation. No signs and symptoms of bleeding/hematoma noted. IV access removed. Instruction provided, patient/family verbalizes understanding. Dr. Arina Stockton spoke with patient/family post procedure. Pt discharge via wheelchair to [facility]. Follow up Appointment: as scheduled. Appointment done per wife to see Dr Maria D Hollingsworth  Next week. New Prescription: None.

## 2022-07-26 NOTE — INTERVAL H&P NOTE
Pre-op Diagnosis: * No pre-op diagnosis entered *    The above referenced H&P was reviewed by Adelaida Diggs MD on 7/26/2022, the patient was examined and no significant changes have occurred in the patient's condition since the H&P was performed. I discussed with the patient and/or legal representative the potential benefits, risks and side effects of this procedure; the likelihood of the patient achieving goals; and potential problems that might occur during recuperation. I discussed reasonable alternatives to the procedure, including risks, benefits and side effects related to the alternatives and risks related to not receiving this procedure. We will proceed with procedure as planned.

## 2022-07-26 NOTE — PROCEDURES
Outpatient Surgery Brief Discharge Summary         Camelia Milligan   [id number]   67 year old    11/16/1940     Discharge Diagnoses: Abnormal stress test with history of heart failure    Procedures: Right left, core    Discharged Condition: stable    Disposition: home    Patient Instructions: Follow-up with Arnold Aquino MD in 1-2 weeks. Diet: regular diet  Activity: Do not drive for 24 hours.   Do not lift more than 10 pounds for 7 days    Arnold Aquino MD  7/26/2022   10:42 AM

## 2022-07-26 NOTE — PROCEDURES
Outpatient Surgery Brief Discharge Summary         Raheel Haque   [id number]   6 year old    11/16/1940     Discharge Diagnoses: CAD    Procedures: Cardiac cath cardiac cath    Discharged Condition: stable    Disposition: home    Patient Instructions: Follow-up with Damaris Rabago MD in 1-2 weeks.     Diet: regular diet  Activity: Do not drive for 24    Damaris Rabago MD  7/26/2022   10:07 AM

## 2022-08-09 ENCOUNTER — TELEPHONE (OUTPATIENT)
Dept: CARDIAC REHAB | Age: 82
End: 2022-08-09

## (undated) DEVICE — DIFFUSER: Brand: CORE, MAESTRO

## (undated) DEVICE — Device: Brand: JELCO

## (undated) DEVICE — SPONGE LAP 4X18 XRAY STRL

## (undated) DEVICE — CODMAN® SURGICAL PATTIES 1/2" X 1/2" (1.27CM X 1.27CM): Brand: CODMAN®

## (undated) DEVICE — BAG SRG CLR 36X30IN

## (undated) DEVICE — DRAPE SHEET LG

## (undated) DEVICE — SLOT PORT RET ASS 26X80

## (undated) DEVICE — 11.1-DISPOSABLEBAYONETTESURG

## (undated) DEVICE — SOL  .9 1000ML BTL

## (undated) DEVICE — FLOSEAL HEMOSTATIC MATRIX, 5ML: Brand: FLOSEAL HEMOSTATIC MATRIX

## (undated) DEVICE — NVM5 PROBE SNGL USE STER PKG

## (undated) DEVICE — 3 ML SYRINGE LUER-LOCK TIP: Brand: MONOJECT

## (undated) DEVICE — Device

## (undated) DEVICE — NEEDLE CONFIDENCE SPINAL

## (undated) DEVICE — SUTURE VICRYL 1 OS-6

## (undated) DEVICE — CONTAINER SPEC STR 4OZ GRY LID

## (undated) DEVICE — DRAPE SRG 150X54IN LEICA

## (undated) DEVICE — DRAPE SRG 26X15IN UTL TPE STRL

## (undated) DEVICE — SUTURE MONOCRYL 2-0 Y945H

## (undated) DEVICE — ENCORE® LATEX MICRO SIZE 6.5, STERILE LATEX POWDER-FREE SURGICAL GLOVE: Brand: ENCORE

## (undated) DEVICE — TRAY FOLEY BDX 16F STATLOCK

## (undated) DEVICE — 11.1-15K-WIRENITINOLW/S

## (undated) DEVICE — NON-ADHERENT PAD PREPACK: Brand: TELFA

## (undated) DEVICE — GAMMEX® PI HYBRID SIZE 7, STERILE POWDER-FREE SURGICAL GLOVE, POLYISOPRENE AND NEOPRENE BLEND: Brand: GAMMEX

## (undated) DEVICE — GAMMEX® PI HYBRID SIZE 9, STERILE POWDER-FREE SURGICAL GLOVE, POLYISOPRENE AND NEOPRENE BLEND: Brand: GAMMEX

## (undated) DEVICE — 1 ML INSULIN SYRINGE REGULAR LUER TIP: Brand: MONOJECT

## (undated) DEVICE — DRAPE SRG 90X60IN BCK TBL CVR

## (undated) DEVICE — SPONGE LAP 18X18 XRAY STRL

## (undated) DEVICE — LAMINECTOMY: Brand: MEDLINE INDUSTRIES, INC.

## (undated) DEVICE — OIL CARTRIDGE: Brand: CORE, MAESTRO

## (undated) DEVICE — C-ARMOR C-ARM EQUIPMENT COVERS CLEAR STERILE UNIVERSAL FIT 12 PER CASE: Brand: C-ARMOR

## (undated) DEVICE — DERMABOND LIQUID ADHESIVE

## (undated) DEVICE — SUTURE MONOCRYL 4-0 PS-2

## (undated) DEVICE — NVM5 MULTIMODALITY SURFACE KIT

## (undated) DEVICE — NEEDLE SPINAL 20X3-1/2 YELLOW

## (undated) DEVICE — SUPER SPONGES,MEDIUM: Brand: KERLIX

## (undated) DEVICE — 3.0MM PRECISION NEURO (MATCH HEAD)

## (undated) DEVICE — ESPOCAN® 18 GA. X 3-1/2 IN. TUOHY WITH BACKEYE LUMEN, PENCAN® 27 GA. X 5 IN. PENCIL-POINT SPINAL NEEDLE: Brand: ESPOCAN®

## (undated) NOTE — MR AVS SNAPSHOT
[facility] - [address]  [address] [phone]               Thank you for choosing us for your health care visit with Radha Rollins DO.   We are glad to serve you and happy to provide you with this summary of requirements for authorization, please wait 5-7 days and then contact your physician's   office. At that time, you will be provided with any authorization numbers or be assured that none are required. You can then schedule your appointment.  Failure to obta discharge instructions in [facility] by going to Visits < Admission Summaries. If you've been to the Emergency Department or your doctor's office, you can view your past visit information in [facility] by going to Visits < Visit Summaries. Right Relevance questions?

## (undated) NOTE — MR AVS SNAPSHOT
[address]  [phone]  [phone]               Thank you for choosing us for your health care visit with [facility].   We are glad to serve you and happy to provide you with [facility] simvastatin 20 MG Tabs   TAKE ONE TABLET BY MOUTH IN THE EVENING ONCE DAILY   Commonly known as:  Teri                   MyChart     Visit MyChart  You can access your MyChart to more actively manage your health care and view more details from this visit

## (undated) NOTE — MR AVS SNAPSHOT
[facility] - [place] [address]  [address] [phone]               Thank you for choosing us for your health care visit with Austyn Donaldson DO.   We are glad to serve you and happy to provide you with this summary of Take  by mouth.  take 1 tablet (81MG)  by oral route  every day           simvastatin 20 MG Tabs   TAKE ONE TABLET BY MOUTH IN THE EVENING ONCE DAILY   Commonly known as:  ZOCOR                Where to Get Your Medications      These medications were sent t

## (undated) NOTE — LETTER
AUTHORIZATION FOR SURGICAL OPERATION OR OTHER PROCEDURE    1.  I hereby authorize Dr. Nyla Gamez, and [address] [place] [address] staff assigned to my case to perform the following operation and/or procedure at the [address] [place] [address]: Witness signature: ___________________________________________________ Date:  ______/______/_____                    Time:  ________ A. M.  P.M.        Patient Name:  ______________________________________________________  (please print)      Patient signatu

## (undated) NOTE — MR AVS SNAPSHOT
[facility] [place] [address] [address]  [phone]               Thank you for choosing us for your health care visit with Isacc Corbett DO.   We are glad to serve you and happy to provide you with this summary of TAKE ONE TABLET BY MOUTH IN THE EVENING ONCE DAILY   Commonly known as:  Teri                   MyChart     Visit [facility]  You can access your MyChart to more actively manage your health care and view more details from this visit by going to [email or web address]

## (undated) NOTE — LETTER
9/27/2019    [facility]             Dear Alyx Edgar,      Our records indicate that you are due for an appointment for a Colonoscopy in October 2019, or shortly there after, with Giuliana Sagastume

## (undated) NOTE — MR AVS SNAPSHOT
[facility] - [place] [address] [place]  [address] [phone]               Thank you for choosing us for your health care visit with Yessica Chauhan MD.  We are glad to serve you and happy to provide you with this summary of MULTI-DAY Tabs   Take  by mouth.  take 1 tablet (81MG)  by oral route  every day           simvastatin 20 MG Tabs   TAKE ONE TABLET BY MOUTH IN THE EVENING ONCE DAILY   Commonly known as:  ZOCOR                   MyChart     Visit [employer]  You can access y

## (undated) NOTE — MR AVS SNAPSHOT
[address]  [phone]  [phone]               Thank you for choosing us for your health care visit with [facility].   We are glad to serve you and happy to provide you with [facility] TAKE ONE TABLET BY MOUTH IN THE EVENING ONCE DAILY   Commonly known as:  Teri                   MyChart     Visit [facility]  You can access your MyChart to more actively manage your health care and view more details from this visit by going to [email or web address]

## (undated) NOTE — MR AVS SNAPSHOT
[facility] SPECIALTY [facility] - [place] [address] [place]  [address] [phone]               Thank you for choosing us for your health care visit with Chente Douglas MD.  We are glad to serve you and happy to provide you with this summary o [facility] (MRI Only)  [address]    It is the patient's responsibility to check with and follow their insurance company's guidelines for prior authorization for this test.  You may be held responsible for payment in full if pr Summaries. If you've been to the Emergency Department or your doctor's office, you can view your past visit information in [employer] by going to Visits < Visit Summaries. [employer] questions? Call [phone] for help.   [employer] is NOT to be used for urge

## (undated) NOTE — MR AVS SNAPSHOT
[facility] SPECIALTY [facility] - [place] [address] [address] [address]  [phone]               Thank you for choosing us for your health care visit with Leisa Linares DO.   We are glad to serve you and happy to provide you with this summary of Cholesterol, covered every 5 yrs including Total, LDL and Trigs CALCULATED LDL (mg/dL)   Date Value   01/28/2016 72     CHOLESTEROL (P) (mg/dL)   Date Value   01/28/2016 129     TRIGLYCERIDE (P) (mg/dL)   Date Value   01/28/2016 92        EKG - covered if Pneumococcal 13 (Prevnar)  Covered Once after 65 No orders found for this or any previous visit. Please get once after your 65th birthday    Pneumococcal 23 (Pneumovax)  Covered Once after 65 No orders found for this or any previous visit.  Please get once [address]   [phone]            May 15, 2017 10:45 AM   Exam - Established with Crystal Baptiste MD   [facility] SPECIALTY [facility] 4 - 10 ng/ml            25%    >10   ng/ml            >50%                COMP METABOLIC PANEL (14)      Component Value Standard Range & Units    Glucose 126 70-99 mg/dL    Sodium 144 136-144 mmol/L    Potassium 4.9 3.3-5.1 mmol/L    Chloride 105  pH Urine 6.0 5.0-8.0    Protein Urine Negative Negative mg/dL    Glucose Urine Negative Negative mg/dL    Ketones Urine Negative Negative mg/dL    Bilirubin Urine Negative Negative    Blood Urine Negative Negative    Nitrite Urine Negative Negative    Uro

## (undated) NOTE — MR AVS SNAPSHOT
After Visit Summary   1/23/2017    Jhoan Hall    MRN: [id number]           Visit Information        Provider Department Dept Phone    1/23/2017 10:45 AM Emmanuel Becker, DO [facility] Med [phone]      Your Vitals Were     BP Pulse Temp(Src) covered, or covered at this frequency, by your insurer. Please check with your insurance carrier before scheduling to verify coverage.     PREVENTATIVE SERVICES  INDICATIONS AND SCHEDULE Internal Lab or Procedure External Lab or Procedure   Diabetes Screeni 10/27/2019 Update Health Maintenance if applicable    Flex Sigmoidoscopy Screen  Covered every 5 years No results found for this or any previous visit. No flowsheet data found.      Fecal Occult Blood   Covered Annually No results found for: FOB, OCCULTSTOO Zoster (Not covered by Medicare Part B) No orders found for this or any previous visit. This may be covered with your pharmacy  prescription benefits     Recommended Websites for Advanced Directives    [email or web address]

## (undated) NOTE — MR AVS SNAPSHOT
[address]  [facility] [address] [address]  [phone]  [phone]               Thank you for choosing us for your health care visit with Ama Uriarte MD.  We are glad to serve you and happy to provide you wi Commonly known as:  PRINIVIL,ZESTRIL           MULTI-DAY Tabs   Take  by mouth.  take 1 tablet (81MG)  by oral route  every day           simvastatin 20 MG Tabs   TAKE ONE TABLET BY MOUTH IN THE EVENING ONCE DAILY   Commonly known as:  Marilyn Munoz office, you can view your past visit information in [employer] by going to Visits < Visit Summaries. [employer] questions? Call [phone] for help. [employer] is NOT to be used for urgent needs. For medical emergencies, dial 911.            Visit [facility]

## (undated) NOTE — Clinical Note
Anna Archer  [address] [place], [place]       06/22/2017        Patient: Atiya Lopez   YOB: 1940   Date of Visit: 6/22/2017       Dear  Dr. Alexa Matthews, DO,      Thank you for referring Atiya Lopez to my practice.   Please fi

## (undated) NOTE — MR AVS SNAPSHOT
[address]  [phone]  [phone]               Thank you for choosing us for your health care visit with [facility].   We are glad to serve you and happy to provide you with [facility] lisinopril 20 MG Tabs   TAKE ONE TABLET BY MOUTH ONCE DAILY   Commonly known as:  PRINIVIL,ZESTRIL           MULTI-DAY Tabs   Take  by mouth.  take 1 tablet (81MG)  by oral route  every day           simvastatin 20 MG Tabs   TAKE ONE TABLET BY MOUTH IN THE

## (undated) NOTE — MR AVS SNAPSHOT
[address]  [phone]  [phone]               Thank you for choosing us for your health care visit with [facility].   We are glad to serve you and happy to provide you with [facility] Take 325 mg by mouth. Cyclobenzaprine HCl 10 MG Tabs   Take 1 tablet (10 mg total) by mouth 3 (three) times daily as needed for Muscle spasms. Commonly known as:  FLEXERIL           KP OMEGA-3 FISH OIL 1200 MG Cpdr   Take  by mouth.  once daily

## (undated) NOTE — MR AVS SNAPSHOT
[address]  [phone]  [phone]               Thank you for choosing us for your health care visit with [facility].   We are glad to serve you and happy to provide you with [facility] Cyclobenzaprine HCl 10 MG Tabs   Take 1 tablet (10 mg total) by mouth 3 (three) times daily as needed for Muscle spasms. Commonly known as:  FLEXERIL           KP OMEGA-3 FISH OIL 1200 MG Cpdr   Take  by mouth.  once daily           lisinopril 20 MG Tabs

## (undated) NOTE — MR AVS SNAPSHOT
[address] [place]  [phone]  [phone]               Thank you for choosing us for your health care visit with [facility].   We are glad to serve you and happy to provide you with [facility] [email or web address]. If you've recently had a stay at the Hospital you can access your discharge instructions in [employer] by going to Visits < Admission Summaries.  If you've been to the Emergency Department or your doctor's office, you can view yo

## (undated) NOTE — MR AVS SNAPSHOT
[address] [place]  [phone]               Thank you for choosing us for your health care visit with [facility].   We are glad to serve you and happy to provide you with [facility] MyChart     Visit [employer]  You can access your MyChart to more actively manage your health care and view more details from this visit by going to [email or web address].   If you've recently had a stay at the Hospital you can access your discharge ins

## (undated) NOTE — MR AVS SNAPSHOT
[address]  [phone]  [phone]               Thank you for choosing us for your health care visit with Josemanuel Woods.   We are glad to serve you and happy to provide you with this sum view more details from this visit by going to [email or web address]. If you've recently had a stay at the Hospital you can access your discharge instructions in [email or web address] by going to Visits < Admission Summaries.  If you've been to the Emergency Depar